# Patient Record
Sex: FEMALE | Race: WHITE | NOT HISPANIC OR LATINO | URBAN - METROPOLITAN AREA
[De-identification: names, ages, dates, MRNs, and addresses within clinical notes are randomized per-mention and may not be internally consistent; named-entity substitution may affect disease eponyms.]

---

## 2023-08-21 ENCOUNTER — HOSPITAL ENCOUNTER (OUTPATIENT)
Dept: DATA CONVERSION | Facility: HOSPITAL | Age: 38
Discharge: HOME | End: 2023-08-21
Payer: COMMERCIAL

## 2023-08-21 DIAGNOSIS — Z91.89 OTHER SPECIFIED PERSONAL RISK FACTORS, NOT ELSEWHERE CLASSIFIED: ICD-10-CM

## 2023-08-21 DIAGNOSIS — R92.2 INCONCLUSIVE MAMMOGRAM: ICD-10-CM

## 2023-09-02 PROBLEM — M94.0 COSTOCHONDRAL JUNCTION SYNDROME: Status: ACTIVE | Noted: 2023-09-02

## 2023-09-02 PROBLEM — N91.2 AMENORRHEA: Status: ACTIVE | Noted: 2023-09-02

## 2023-09-02 PROBLEM — N92.1 MENOMETRORRHAGIA: Status: ACTIVE | Noted: 2023-09-02

## 2023-09-02 PROBLEM — N92.0 MENORRHAGIA WITH REGULAR CYCLE: Status: ACTIVE | Noted: 2023-09-02

## 2023-09-02 PROBLEM — L60.3 ONYCHODYSTROPHY: Status: ACTIVE | Noted: 2023-09-02

## 2023-09-02 PROBLEM — N93.0 POSTCOITAL BLEEDING: Status: ACTIVE | Noted: 2023-09-02

## 2023-09-02 PROBLEM — D64.9 ANEMIA: Status: ACTIVE | Noted: 2023-09-02

## 2023-09-02 PROBLEM — M25.50 ARTHRALGIA: Status: ACTIVE | Noted: 2023-09-02

## 2023-09-02 PROBLEM — F41.9 ANXIETY: Status: ACTIVE | Noted: 2023-09-02

## 2023-09-02 PROBLEM — F41.8 MIXED ANXIETY AND DEPRESSIVE DISORDER: Status: ACTIVE | Noted: 2023-09-02

## 2023-09-02 PROBLEM — M26.629 TEMPOROMANDIBULAR JOINT-PAIN-DYSFUNCTION SYNDROME: Status: ACTIVE | Noted: 2023-09-02

## 2023-09-02 RX ORDER — ALPRAZOLAM 0.25 MG/1
0.25 TABLET ORAL DAILY PRN
COMMUNITY
Start: 2022-10-12 | End: 2024-02-09 | Stop reason: SDUPTHER

## 2023-09-02 RX ORDER — ASCORBIC ACID 500 MG
TABLET ORAL
COMMUNITY

## 2023-09-02 RX ORDER — ZINC GLUCONATE 50 MG
50 TABLET ORAL DAILY
COMMUNITY
End: 2023-11-30 | Stop reason: WASHOUT

## 2023-09-02 RX ORDER — CHOLECALCIFEROL (VITAMIN D3) 25 MCG
25 TABLET ORAL DAILY
COMMUNITY

## 2023-09-02 RX ORDER — SERTRALINE HYDROCHLORIDE 25 MG/1
25 TABLET, FILM COATED ORAL DAILY
COMMUNITY
End: 2023-11-15

## 2023-11-13 ENCOUNTER — TELEPHONE (OUTPATIENT)
Dept: OBSTETRICS AND GYNECOLOGY | Facility: CLINIC | Age: 38
End: 2023-11-13
Payer: COMMERCIAL

## 2023-11-13 NOTE — TELEPHONE ENCOUNTER
Pt pregnant; amenorrhea appt scheduled for 11/30.  Pt c/o dry, tickly cough x2 weeks and is wanting to know what she can take OTC.  Pt advised ok to take Robitussin, cough drops, and plain mucinex.  Advised if no improvement to call her PCP

## 2023-11-15 DIAGNOSIS — F41.9 ANXIETY: ICD-10-CM

## 2023-11-15 RX ORDER — SERTRALINE HYDROCHLORIDE 25 MG/1
25 TABLET, FILM COATED ORAL DAILY
Qty: 90 TABLET | Refills: 2 | Status: SHIPPED | OUTPATIENT
Start: 2023-11-15 | End: 2024-02-09 | Stop reason: SDUPTHER

## 2023-11-27 NOTE — PROGRESS NOTES
Subjective   Patient ID [id number]   Abigail Mann is a 38 y.o. No obstetric history on file. at Unknown with a working estimated date of delivery of Not found. who presents for an initial prenatal visit.     Her pregnancy is complicated by:  AMA    OB History   No obstetric history on file.          Objective   Physical Exam     Pregravid BMI: Could not be calculated             PROCEDURE:  OB/GYN Transvaginal U/S    Intrauterine - yes  Yolk Sac - yes  Fetal Pole- single  FHT  120  EDC  7/17/2024  CRL       OBGyn Exam    General Physical Exam    HEENT: normal Heart: normal Skin: normal   Thyroid: normal Lungs: normal Extremities: normal   Lymph Nodes: normal Breasts: normal Neurological: normal   Abdomen: normal        Pelvic Exam    Vulva: normal Vagina: normal   Cervix: normal Adnexa: normal   Rectum: normal Spines: average   Subpubic Arch: normal       Prenatal Labs  Results for orders placed or performed in visit on 12/08/22   [employer] TEST   Result Value Ref Range    [employer] Specimen sent,Results will be sent to Physician          Assessment/Plan   Diagnoses and all orders for this visit:  Amenorrhea  -     POCT UA Automated manually resulted  -     POCT pregnancy, urine manually resulted  -     CBC Anemia Panel With Reflex,Pregnancy; Future  -     Type And Screen; Future  -     Hepatitis B surface antigen; Future  -     Hepatitis C antibody; Future  -     Rubella Antibody, IgG; Future  -     Syphilis Screen with Reflex; Future  -     HIV 1/2 Antigen/Antibody Screen with Reflex to Confirmation; Future  -     Urine Culture  -     C. Trachomatis / N. Gonorrhoeae, Amplified Detection; Future  -     [employer] Prequel Prenatal Screen; Future    Immunizations:   Prenatal Labs ordered  Daily prenatal vitamins prescribed  First trimester screening and second trimester screening discussed. Patient decided to have done  Follow up in 4 weeks for return OB visit.

## 2023-11-28 ENCOUNTER — TELEPHONE (OUTPATIENT)
Dept: OBSTETRICS AND GYNECOLOGY | Facility: CLINIC | Age: 38
End: 2023-11-28
Payer: COMMERCIAL

## 2023-11-28 NOTE — TELEPHONE ENCOUNTER
Amenorrhea appt scheduled for Thursday.  Pt with one episode of scant spotting while wiping Sunday, and had some cramping yesterday.  Pt unsure if she has gas pain or if it was actual cramping.  Reassured pt, to call if pt has any bleeding like a period.  Advised pt she will have ultrasound on thursday

## 2023-11-30 ENCOUNTER — INITIAL PRENATAL (OUTPATIENT)
Dept: OBSTETRICS AND GYNECOLOGY | Facility: CLINIC | Age: 38
End: 2023-11-30
Payer: COMMERCIAL

## 2023-11-30 VITALS — BODY MASS INDEX: 20.05 KG/M2 | WEIGHT: 135.8 LBS | DIASTOLIC BLOOD PRESSURE: 80 MMHG | SYSTOLIC BLOOD PRESSURE: 128 MMHG

## 2023-11-30 DIAGNOSIS — N91.2 AMENORRHEA: Primary | ICD-10-CM

## 2023-11-30 LAB
POC APPEARANCE, URINE: CLEAR
POC BILIRUBIN, URINE: NEGATIVE
POC BLOOD, URINE: ABNORMAL
POC COLOR, URINE: YELLOW
POC GLUCOSE, URINE: NEGATIVE MG/DL
POC KETONES, URINE: NEGATIVE MG/DL
POC LEUKOCYTES, URINE: NEGATIVE
POC NITRITE,URINE: NEGATIVE
POC PH, URINE: 6.5 PH
POC PROTEIN, URINE: NEGATIVE MG/DL
POC SPECIFIC GRAVITY, URINE: 1.01
POC UROBILINOGEN, URINE: 0.2 EU/DL
PREGNANCY TEST URINE, POC: POSITIVE

## 2023-11-30 PROCEDURE — 76801 OB US < 14 WKS SINGLE FETUS: CPT | Performed by: OBSTETRICS & GYNECOLOGY

## 2023-11-30 PROCEDURE — 87800 DETECT AGNT MULT DNA DIREC: CPT | Performed by: OBSTETRICS & GYNECOLOGY

## 2023-11-30 PROCEDURE — 0500F INITIAL PRENATAL CARE VISIT: CPT | Performed by: OBSTETRICS & GYNECOLOGY

## 2023-11-30 PROCEDURE — 87086 URINE CULTURE/COLONY COUNT: CPT | Performed by: OBSTETRICS & GYNECOLOGY

## 2023-11-30 PROCEDURE — 81003 URINALYSIS AUTO W/O SCOPE: CPT | Performed by: OBSTETRICS & GYNECOLOGY

## 2023-11-30 ASSESSMENT — PATIENT HEALTH QUESTIONNAIRE - PHQ9
SUM OF ALL RESPONSES TO PHQ9 QUESTIONS 1 & 2: 0
2. FEELING DOWN, DEPRESSED OR HOPELESS: NOT AT ALL
1. LITTLE INTEREST OR PLEASURE IN DOING THINGS: NOT AT ALL

## 2023-11-30 ASSESSMENT — LIFESTYLE VARIABLES
AUDIT-C TOTAL SCORE: 0
SKIP TO QUESTIONS 9-10: 1
HOW OFTEN DO YOU HAVE SIX OR MORE DRINKS ON ONE OCCASION: NEVER
HOW MANY STANDARD DRINKS CONTAINING ALCOHOL DO YOU HAVE ON A TYPICAL DAY: PATIENT DOES NOT DRINK
HOW OFTEN DO YOU HAVE A DRINK CONTAINING ALCOHOL: NEVER

## 2023-11-30 ASSESSMENT — ENCOUNTER SYMPTOMS
DEPRESSION: 0
OCCASIONAL FEELINGS OF UNSTEADINESS: 0
LOSS OF SENSATION IN FEET: 0

## 2023-12-01 LAB
C TRACH RRNA SPEC QL NAA+PROBE: NEGATIVE
N GONORRHOEA DNA SPEC QL PROBE+SIG AMP: NEGATIVE

## 2023-12-02 LAB — BACTERIA UR CULT: NO GROWTH

## 2023-12-08 ENCOUNTER — APPOINTMENT (OUTPATIENT)
Dept: PRIMARY CARE | Facility: CLINIC | Age: 38
End: 2023-12-08
Payer: COMMERCIAL

## 2023-12-08 ENCOUNTER — TELEPHONE (OUTPATIENT)
Dept: OBSTETRICS AND GYNECOLOGY | Facility: CLINIC | Age: 38
End: 2023-12-08
Payer: COMMERCIAL

## 2023-12-08 ASSESSMENT — PROMIS GLOBAL HEALTH SCALE
RATE_QUALITY_OF_LIFE: VERY GOOD
RATE_SOCIAL_SATISFACTION: VERY GOOD
RATE_GENERAL_HEALTH: VERY GOOD
CARRYOUT_PHYSICAL_ACTIVITIES: COMPLETELY
EMOTIONAL_PROBLEMS: SOMETIMES
CARRYOUT_SOCIAL_ACTIVITIES: VERY GOOD
RATE_PHYSICAL_HEALTH: VERY GOOD
RATE_MENTAL_HEALTH: VERY GOOD
RATE_AVERAGE_FATIGUE: MODERATE
RATE_AVERAGE_PAIN: 0

## 2023-12-08 NOTE — TELEPHONE ENCOUNTER
8 weeks pregnant.  Pt with continued spotting since appointment last week.  Pt states has been mostly brown until today.  Today had a bigger episode of spotting when she went to the bathroom, was more red.  She also has had a headache all day today, which she just took tylenol for.  Pt states she was having the spotting when she came in for her exam as well.  Pt advised to continue to monitor and call with any bleeding like a period.

## 2023-12-11 ENCOUNTER — ROUTINE PRENATAL (OUTPATIENT)
Dept: OBSTETRICS AND GYNECOLOGY | Facility: CLINIC | Age: 38
End: 2023-12-11
Payer: COMMERCIAL

## 2023-12-11 ENCOUNTER — TELEPHONE (OUTPATIENT)
Dept: OBSTETRICS AND GYNECOLOGY | Facility: CLINIC | Age: 38
End: 2023-12-11
Payer: COMMERCIAL

## 2023-12-11 ENCOUNTER — PREP FOR PROCEDURE (OUTPATIENT)
Dept: OBSTETRICS AND GYNECOLOGY | Facility: HOSPITAL | Age: 38
End: 2023-12-11

## 2023-12-11 VITALS — BODY MASS INDEX: 19.94 KG/M2 | SYSTOLIC BLOOD PRESSURE: 128 MMHG | DIASTOLIC BLOOD PRESSURE: 70 MMHG | WEIGHT: 135 LBS

## 2023-12-11 DIAGNOSIS — O03.4 INCOMPLETE SPONTANEOUS ABORTION WITHOUT COMPLICATION (HHS-HCC): Primary | ICD-10-CM

## 2023-12-11 PROCEDURE — 76801 OB US < 14 WKS SINGLE FETUS: CPT | Performed by: OBSTETRICS & GYNECOLOGY

## 2023-12-11 PROCEDURE — 0501F PRENATAL FLOW SHEET: CPT | Performed by: OBSTETRICS & GYNECOLOGY

## 2023-12-11 RX ORDER — SODIUM CHLORIDE, SODIUM LACTATE, POTASSIUM CHLORIDE, CALCIUM CHLORIDE 600; 310; 30; 20 MG/100ML; MG/100ML; MG/100ML; MG/100ML
125 INJECTION, SOLUTION INTRAVENOUS CONTINUOUS
Status: CANCELLED | OUTPATIENT
Start: 2023-12-11

## 2023-12-11 ASSESSMENT — LIFESTYLE VARIABLES
HOW OFTEN DO YOU HAVE A DRINK CONTAINING ALCOHOL: MONTHLY OR LESS
HOW OFTEN DO YOU HAVE SIX OR MORE DRINKS ON ONE OCCASION: NEVER
SKIP TO QUESTIONS 9-10: 1
HOW MANY STANDARD DRINKS CONTAINING ALCOHOL DO YOU HAVE ON A TYPICAL DAY: 1 OR 2
AUDIT-C TOTAL SCORE: 1

## 2023-12-11 ASSESSMENT — PATIENT HEALTH QUESTIONNAIRE - PHQ9
SUM OF ALL RESPONSES TO PHQ9 QUESTIONS 1 & 2: 0
1. LITTLE INTEREST OR PLEASURE IN DOING THINGS: NOT AT ALL
2. FEELING DOWN, DEPRESSED OR HOPELESS: NOT AT ALL

## 2023-12-11 ASSESSMENT — ENCOUNTER SYMPTOMS
LOSS OF SENSATION IN FEET: 0
OCCASIONAL FEELINGS OF UNSTEADINESS: 0
DEPRESSION: 0

## 2023-12-11 NOTE — TELEPHONE ENCOUNTER
8 weeks pregnant.  Pt c/o continued bleeding since end of last week.  Pt states bleeding a little more heavier now, is soaking through panti liners,  and is bright red.  Pt requesting appt for a scan.  Appt scheduled for today at 3:15 for rescan

## 2023-12-11 NOTE — PROGRESS NOTES
Subjective   Patient ID [id number]   Abigail Mann is a 38 y.o.   who presents for initial amenorrhea visit.    Lmp=  2023  Bleeding  yes, conintued spotting and heavier at times  Pain  no    OB History    Para Term  AB Living   3 2 2 0 0 2   SAB IAB Ectopic Multiple Live Births   0 0 0 0 2      # Outcome Date GA Lbr Bobby/2nd Weight Sex Delivery Anes PTL Lv   3 Current            2 Term 20   3175 g F CS-LTranv   DESI   1 Term 10/30/18   3544 g M CS-LTranv   DESI          Objective   Physical Exam  Weight: 61.2 kg (135 lb)  BP: 128/70            PROCEDURE:  OB/GYN Transvaginal U/S    Intrauterine - yes  Yolk Sac - yes  Fetal Pole- single  FHT  no  EDC  no  CRL  7w,1d     OBGyn Exam    General Physical Exam    HEENT: normal Heart: normal Skin: normal   Thyroid: normal Lungs: normal Extremities: normal   Lymph Nodes: normal Breasts: normal Neurological: normal   Abdomen: normal        Pelvic Exam    Vulva: normal Vagina: normal   Cervix: normal Adnexa: normal   Rectum: normal Spines: average   Subpubic Arch: normal       Prenatal Labs  Results for orders placed or performed in visit on 23   Urine Culture    Specimen: Clean Catch/Voided; Urine   Result Value Ref Range    Urine Culture No growth    C. Trachomatis / N. Gonorrhoeae, Amplified Detection   Result Value Ref Range    Neisseria gonorrhea,Amplified Negative Negative    Chlamydia trachomatis, Amplified Negative Negative   POCT UA Automated manually resulted   Result Value Ref Range    POC Color, Urine Yellow Straw, Yellow, Light-Yellow    POC Appearance, Urine Clear Clear    POC Glucose, Urine NEGATIVE NEGATIVE mg/dl    POC Bilirubin, Urine NEGATIVE NEGATIVE    POC Ketones, Urine NEGATIVE NEGATIVE mg/dl    POC Specific Gravity, Urine 1.015 1.005 - 1.035    POC Blood, Urine TRACE-Intact (A) NEGATIVE    POC PH, Urine 6.5 No Reference Range Established PH    POC Protein, Urine NEGATIVE NEGATIVE, 30 (1+) mg/dl    POC  Urobilinogen, Urine 0.2 0.2, 1.0 EU/DL    Poc Nitrite, Urine NEGATIVE NEGATIVE    POC Leukocytes, Urine NEGATIVE NEGATIVE   POCT pregnancy, urine manually resulted   Result Value Ref Range    Preg Test, Ur Positive (A) Negative         Assessment/Plan   Diagnoses and all orders for this visit:  Incomplete spontaneous  without complication    Quant ordered

## 2023-12-12 ENCOUNTER — HOSPITAL ENCOUNTER (OUTPATIENT)
Facility: HOSPITAL | Age: 38
Setting detail: OBSERVATION
Discharge: HOME | DRG: 770 | End: 2023-12-12
Attending: OBSTETRICS & GYNECOLOGY | Admitting: OBSTETRICS & GYNECOLOGY
Payer: COMMERCIAL

## 2023-12-12 ENCOUNTER — ANESTHESIA (OUTPATIENT)
Dept: OBSTETRICS AND GYNECOLOGY | Facility: HOSPITAL | Age: 38
DRG: 770 | End: 2023-12-12
Payer: COMMERCIAL

## 2023-12-12 VITALS
SYSTOLIC BLOOD PRESSURE: 107 MMHG | TEMPERATURE: 97.7 F | HEART RATE: 82 BPM | RESPIRATION RATE: 16 BRPM | DIASTOLIC BLOOD PRESSURE: 58 MMHG | OXYGEN SATURATION: 100 %

## 2023-12-12 DIAGNOSIS — O03.4 INCOMPLETE SPONTANEOUS ABORTION WITHOUT COMPLICATION (HHS-HCC): Primary | ICD-10-CM

## 2023-12-12 PROCEDURE — 88305 TISSUE EXAM BY PATHOLOGIST: CPT | Mod: TC | Performed by: OBSTETRICS & GYNECOLOGY

## 2023-12-12 PROCEDURE — 88305 TISSUE EXAM BY PATHOLOGIST: CPT | Performed by: PATHOLOGY

## 2023-12-12 PROCEDURE — G0378 HOSPITAL OBSERVATION PER HR: HCPCS

## 2023-12-12 PROCEDURE — 59812 TREATMENT OF MISCARRIAGE: CPT | Performed by: OBSTETRICS & GYNECOLOGY

## 2023-12-12 PROCEDURE — 7100000016 HC LABOR RECOVERY PER HOUR: Performed by: OBSTETRICS & GYNECOLOGY

## 2023-12-12 PROCEDURE — 2500000004 HC RX 250 GENERAL PHARMACY W/ HCPCS (ALT 636 FOR OP/ED): Performed by: OBSTETRICS & GYNECOLOGY

## 2023-12-12 PROCEDURE — A59812 PR SURG RX INCOMPLETE ABORTN: Performed by: NURSE ANESTHETIST, CERTIFIED REGISTERED

## 2023-12-12 PROCEDURE — 3700000014 HC AN EPIDURAL BLOCK CHARGE: Performed by: OBSTETRICS & GYNECOLOGY

## 2023-12-12 PROCEDURE — 2500000004 HC RX 250 GENERAL PHARMACY W/ HCPCS (ALT 636 FOR OP/ED): Performed by: NURSE ANESTHETIST, CERTIFIED REGISTERED

## 2023-12-12 PROCEDURE — 2500000005 HC RX 250 GENERAL PHARMACY W/O HCPCS: Performed by: NURSE ANESTHETIST, CERTIFIED REGISTERED

## 2023-12-12 PROCEDURE — 1210000001 HC SEMI-PRIVATE ROOM DAILY

## 2023-12-12 PROCEDURE — 3600000024 HC TREATMENT OF THERAPEUTIC ABORTION: Performed by: OBSTETRICS & GYNECOLOGY

## 2023-12-12 PROCEDURE — 3700000018 HC OB ANESTHESIA C-SECTION: Performed by: OBSTETRICS & GYNECOLOGY

## 2023-12-12 RX ORDER — FENTANYL CITRATE 50 UG/ML
INJECTION, SOLUTION INTRAMUSCULAR; INTRAVENOUS AS NEEDED
Status: DISCONTINUED | OUTPATIENT
Start: 2023-12-12 | End: 2023-12-12

## 2023-12-12 RX ORDER — ONDANSETRON HYDROCHLORIDE 2 MG/ML
INJECTION, SOLUTION INTRAVENOUS AS NEEDED
Status: DISCONTINUED | OUTPATIENT
Start: 2023-12-12 | End: 2023-12-12

## 2023-12-12 RX ORDER — MIDAZOLAM HYDROCHLORIDE 1 MG/ML
INJECTION, SOLUTION INTRAMUSCULAR; INTRAVENOUS AS NEEDED
Status: DISCONTINUED | OUTPATIENT
Start: 2023-12-12 | End: 2023-12-12

## 2023-12-12 RX ORDER — KETOROLAC TROMETHAMINE 30 MG/ML
INJECTION, SOLUTION INTRAMUSCULAR; INTRAVENOUS AS NEEDED
Status: DISCONTINUED | OUTPATIENT
Start: 2023-12-12 | End: 2023-12-12

## 2023-12-12 RX ORDER — SODIUM CHLORIDE, SODIUM LACTATE, POTASSIUM CHLORIDE, CALCIUM CHLORIDE 600; 310; 30; 20 MG/100ML; MG/100ML; MG/100ML; MG/100ML
125 INJECTION, SOLUTION INTRAVENOUS CONTINUOUS
Status: DISCONTINUED | OUTPATIENT
Start: 2023-12-12 | End: 2023-12-12

## 2023-12-12 RX ORDER — LIDOCAINE HYDROCHLORIDE 20 MG/ML
INJECTION, SOLUTION INFILTRATION; PERINEURAL AS NEEDED
Status: DISCONTINUED | OUTPATIENT
Start: 2023-12-12 | End: 2023-12-12

## 2023-12-12 RX ORDER — PROPOFOL 10 MG/ML
INJECTION, EMULSION INTRAVENOUS AS NEEDED
Status: DISCONTINUED | OUTPATIENT
Start: 2023-12-12 | End: 2023-12-12

## 2023-12-12 RX ADMIN — LIDOCAINE HYDROCHLORIDE 60 MG: 20 INJECTION, SOLUTION INFILTRATION; PERINEURAL at 08:14

## 2023-12-12 RX ADMIN — MIDAZOLAM 2 MG: 1 INJECTION INTRAMUSCULAR; INTRAVENOUS at 08:14

## 2023-12-12 RX ADMIN — PROPOFOL 170 MG: 10 INJECTION, EMULSION INTRAVENOUS at 08:14

## 2023-12-12 RX ADMIN — KETOROLAC TROMETHAMINE 30 MG: 30 INJECTION, SOLUTION INTRAMUSCULAR at 08:23

## 2023-12-12 RX ADMIN — FENTANYL CITRATE 50 MCG: 0.05 INJECTION, SOLUTION INTRAMUSCULAR; INTRAVENOUS at 08:14

## 2023-12-12 RX ADMIN — ONDANSETRON HYDROCHLORIDE 4 MG: 2 INJECTION INTRAMUSCULAR; INTRAVENOUS at 08:21

## 2023-12-12 RX ADMIN — SODIUM CHLORIDE, SODIUM LACTATE, POTASSIUM CHLORIDE, AND CALCIUM CHLORIDE 125 ML/HR: 600; 310; 30; 20 INJECTION, SOLUTION INTRAVENOUS at 07:53

## 2023-12-12 SDOH — HEALTH STABILITY: MENTAL HEALTH: CURRENT SMOKER: 0

## 2023-12-12 ASSESSMENT — PAIN SCALES - GENERAL
PAINLEVEL_OUTOF10: 0 - NO PAIN
PAIN_LEVEL: 0

## 2023-12-12 ASSESSMENT — COLUMBIA-SUICIDE SEVERITY RATING SCALE - C-SSRS
6. HAVE YOU EVER DONE ANYTHING, STARTED TO DO ANYTHING, OR PREPARED TO DO ANYTHING TO END YOUR LIFE?: NO
1. IN THE PAST MONTH, HAVE YOU WISHED YOU WERE DEAD OR WISHED YOU COULD GO TO SLEEP AND NOT WAKE UP?: NO
2. HAVE YOU ACTUALLY HAD ANY THOUGHTS OF KILLING YOURSELF?: NO

## 2023-12-12 NOTE — ANESTHESIA PREPROCEDURE EVALUATION
Patient: Abigail Mann    Evaluation Method: In-person visit    Procedure Information       Date/Time: 23 0800    Procedures:       Dilatation and Curettage       Therapeutic    Location: [facility] OB 01 / [facility]    Surgeons: Ca Reyes MD            Relevant Problems   Anesthesia (within normal limits)      Cardiovascular (within normal limits)      Endocrine (within normal limits)      GI (within normal limits)      /Renal (within normal limits)      Neuro/Psych   (+) Anxiety   (+) Mixed anxiety and depressive disorder      Pulmonary (within normal limits)      GI/Hepatic (within normal limits)      Hematology   (+) Anemia       Clinical information reviewed:   Tobacco  Allergies  Meds  Problems  Med Hx  Surg Hx   Fam Hx  Soc   Hx        NPO Detail:  NPO/Void Status  Date of Last Liquid: 23  Time of Last Liquid:   Date of Last Solid: 23  Time of Last Solid: 1300         OB/GYN     Physical Exam    Airway  Mallampati: II  TM distance: >3 FB  Neck ROM: full     Cardiovascular - normal exam     Dental - normal exam     Pulmonary - normal exam     Abdominal - normal exam             Anesthesia Plan    ASA 1     general     The patient is not a current smoker.  Patient did not smoke on day of procedure.  Education provided regarding risk of obstructive sleep apnea.  intravenous induction   Anesthetic plan and risks discussed with patient.    Plan discussed with CRNA.

## 2023-12-12 NOTE — OP NOTE
Date: 2023  OR Location: [facility] [place] 3 OB    Name: Abigail Mann, : 1985, Age: 38 y.o., MRN: [id number], Sex: female    Diagnosis  Pre-op Diagnosis     * Incomplete spontaneous  without complication [O03.4] Post-op Diagnosis     * Incomplete spontaneous  without complication [O03.4]     Procedures  Dilatation and Curettage  17439 - PA INDUCED  DILATION AND CURETTAGE     Therapeutic  89256 - PA TX INCOMPLETE  ANY TRIMESTER SURGICAL      Surgeons      * Ca Reyes - Primary    Resident/Fellow/Other Assistant:  Surgeon(s) and Role:    Procedure Summary  Anesthesia: * No anesthesia type entered *  ASA: I  Anesthesia Staff: CRNA: PRATIMA Carter-CRNA  Estimated Blood Loss: 50 mL  Intra-op Medications:   Medication Name Total Dose   lactated Ringer's infusion 79.17 mL              Anesthesia Record               Intraprocedure I/O Totals          Intake    lactated Ringer's infusion 400.00 mL    Total Intake 400 mL       Output    Est. Blood Loss 50 mL    Total Output 50 mL       Net    Net Volume 350 mL          Specimen:   ID Type Source Tests Collected by Time   1 : specimen Tissue [employer] SURGICAL PATHOLOGY EXAM Ca Reyes MD 2023 0825        Staff:   No surgical staff documented.         Procedure Details:  The patient was seen in the preoperative area. The site of surgery was properly noted/marked if necessary per policy. The patient has been actively warmed in preoperative area. Preoperative antibiotics are not indicated. Venous thrombosis prophylaxis are not indicated.    The patient was taken to the operative and after adequate anesthesia was given an examination under anesthesia was performed. The uterus appeared to be 8 weeks size there is no adnexal pathology palpable. the patient was placed in dorsal lithotomy position and then the patient's vulva vagina and a inner  aspects of the thighs were prepped and draped the usual  sterile manner. a straight cath was used to empty the bladder. at this point a weighted speculum was placed in the posterior fornix of the vagina and a single-tooth tenaculum was used to grasp the anterior lip of the cervix .the uterus was then sounded to [8] cm using Fernandez dilators. an [8] millimeter curved suction tip catheter was placed in the uterine cavity. The contents of the uterus were then evacuated. at this point a small curette was used to curette the endometrium and there was no further tissue noted. at this time the [8] millimeter curved suction tip catheter was placed in the fundus of the uterus and again no tissue remained at this time. the tenaculum was then removed and there was no bleeding noted from the tenaculum site. the speculum was then removed as well.  final sponge count and instrument count were found to be correct. and the patient tolerated the procedure very well and was then taken to the recovery room in overall satisfactory condition.     Findings: uterus 8 weeks size    Complications:  none     Disposition: PACU - hemodynamically stable.  Condition: stable

## 2023-12-12 NOTE — DISCHARGE INSTRUCTIONS
"After Your Surgery Discharge Instructions    After Discharge Orders:  Follow up in 2 weeks          After your surgery - signs and symptoms to watch for:  Fever - Oral temperature greater than 100.4 degrees Fahrenheit  Foul-smelling vaginal discharge  Headache unrelieved by \"pain meds\"  Difficulty urinating  Difficulty breathing with or without chest pain  New calf pain especially if only on one side  Sudden, continuing increased vaginal bleeding with or without clots       What to do at home:  Resume activity gradually   Don't lift anything heavier than 10-15 pounds until OK'd by your Physician   No sex or anything in the vagina until OK'd by your Physician   Take pain medication as prescribed whenever you need them  Wear compression stockings if prescribed   To avoid/relieve constipation take stool softeners if advised    "

## 2023-12-12 NOTE — ANESTHESIA PROCEDURE NOTES
Airway  Date/Time: 12/12/2023 8:17 AM  Urgency: elective    Airway not difficult    Staffing  Performed: CRNA   Authorized by: CELESTE Carter    Performed by: CELESTE Carter  Patient location during procedure: OR    Indications and Patient Condition  Indications for airway management: anesthesia  Spontaneous ventilation: present  Sedation level: deep  Preoxygenated: yes  Patient position: sniffing  MILS maintained throughout  Mask difficulty assessment: 1 - vent by mask  No planned trial extubation    Final Airway Details  Final airway type: supraglottic airway      Successful airway: unique  Size 4     Number of attempts at approach: 1  Ventilation between attempts: none  Number of other approaches attempted: 0

## 2023-12-12 NOTE — ANESTHESIA POSTPROCEDURE EVALUATION
Patient: Abigail Mann    Procedure Summary       Date: 23 Room / Location: [facility]  [facility]    Anesthesia Start: 808 Anesthesia Stop: 840    Procedures:       Dilatation and Curettage       Therapeutic Diagnosis:       Incomplete spontaneous  without complication      (Incomplete spontaneous  without complication [O03.4])    Surgeons: Ca Reyes MD Responsible Provider: CELESTE Carter    Anesthesia Type: general ASA Status: 1            Anesthesia Type: general    Vitals Value Taken Time   BP 91/55 23 0835   Temp 36.3 °C (97.3 °F) 23 0835   Pulse 76 23 0839   Resp 16 23 0835   SpO2 100 % 23 0839       Anesthesia Post Evaluation    Patient location during evaluation: bedside  Patient participation: complete - patient participated  Level of consciousness: responsive to light touch  Pain score: 0  Pain management: adequate  Multimodal analgesia pain management approach  Airway patency: patent  Two or more strategies used to mitigate risk of obstructive sleep apnea  Cardiovascular status: acceptable  Respiratory status: acceptable  Hydration status: acceptable  Postoperative Nausea and Vomiting: none        There were no known notable events for this encounter.

## 2023-12-12 NOTE — H&P
Obstetrical Admission History and Physical     37 yo  who presents wit hincomplete  for suction D&C     Obstetrical History   OB History    Para Term  AB Living   3 2 2 0 0 2   SAB IAB Ectopic Multiple Live Births   0 0 0 0 2      # Outcome Date GA Lbr Bobby/2nd Weight Sex Delivery Anes PTL Lv   3 Current            2 Term 20   3175 g F CS-LTranv   DESI   1 Term 10/30/18   3544 g M CS-LTranv   DESI       Past Medical History  Past Medical History:   Diagnosis Date    Depression         Past Surgical History   Past Surgical History:   Procedure Laterality Date     SECTION, LOW TRANSVERSE  2020    x 2 & 2018    NASAL POLYP SURGERY Right 2020       Social History  Social History     Tobacco Use    Smoking status: Never    Smokeless tobacco: Never   Substance Use Topics    Alcohol use: Not Currently     Substance and Sexual Activity   Drug Use Never       Allergies  Patient has no known allergies.     Medications  Medications Prior to Admission   Medication Sig Dispense Refill Last Dose    ALPRAZolam (Xanax) 0.25 mg tablet Take 1 tablet (0.25 mg) by mouth once daily as needed for anxiety.       ascorbic acid (Vitamin C) 500 mg tablet Take by mouth.       cholecalciferol (Vitamin D-3) 25 MCG (1000 UT) tablet Take 1 tablet (25 mcg) by mouth once daily.       sertraline (Zoloft) 25 mg tablet TAKE ONE TABLET BY MOUTH ONCE A DAY 90 tablet 2        Objective    Last Vitals  Temp Pulse Resp BP MAP O2 Sat   36.4 °C (97.5 °F) 86 16 103/68   100 %     Physical Examination  Normal exam  Ultrasound with CRL=7w,1d with no FHT    Lab Review  Labs in chart were reviewed.

## 2023-12-13 LAB
LABORATORY COMMENT REPORT: NORMAL
PATH REPORT.FINAL DX SPEC: NORMAL
PATH REPORT.GROSS SPEC: NORMAL
PATH REPORT.RELEVANT HX SPEC: NORMAL
PATH REPORT.TOTAL CANCER: NORMAL

## 2024-01-03 NOTE — PROGRESS NOTES
Subjective     Abigail Mann is a 38 y.o. female who presents to the clinic 2 weeks status post D&C for SAB.   Pathology: normal  Eating a regular diet without difficulty.   Bowel movements are normal.   The patient is not having any pain.    Objective   LMP 09/28/2023   General:  alert and oriented, in no acute distress   Abdomen: soft, bowel sounds active, non-tender   Incision:   healing well, no drainage, no erythema, 2no seroma, no swelling, no dehiscence, incision well approximated       Assessment/Plan    Doing well postoperatively.  Operative findings again reviewed. Pathology report discussed.    1. Continue any current medications.  2. Activity restrictions: none  3. Follow up: 1 or prn

## 2024-01-04 ENCOUNTER — OFFICE VISIT (OUTPATIENT)
Dept: OBSTETRICS AND GYNECOLOGY | Facility: CLINIC | Age: 39
End: 2024-01-04
Payer: COMMERCIAL

## 2024-01-04 VITALS
DIASTOLIC BLOOD PRESSURE: 70 MMHG | WEIGHT: 140.2 LBS | HEIGHT: 68 IN | SYSTOLIC BLOOD PRESSURE: 116 MMHG | BODY MASS INDEX: 21.25 KG/M2

## 2024-01-04 DIAGNOSIS — Z09 POSTOP CHECK: Primary | ICD-10-CM

## 2024-01-04 PROCEDURE — 99213 OFFICE O/P EST LOW 20 MIN: CPT | Performed by: OBSTETRICS & GYNECOLOGY

## 2024-01-04 PROCEDURE — 1036F TOBACCO NON-USER: CPT | Performed by: OBSTETRICS & GYNECOLOGY

## 2024-01-04 PROCEDURE — 99024 POSTOP FOLLOW-UP VISIT: CPT | Performed by: OBSTETRICS & GYNECOLOGY

## 2024-01-04 ASSESSMENT — ENCOUNTER SYMPTOMS
DEPRESSION: 0
LOSS OF SENSATION IN FEET: 0
OCCASIONAL FEELINGS OF UNSTEADINESS: 0

## 2024-01-04 ASSESSMENT — LIFESTYLE VARIABLES
HOW MANY STANDARD DRINKS CONTAINING ALCOHOL DO YOU HAVE ON A TYPICAL DAY: 1 OR 2
HOW OFTEN DO YOU HAVE A DRINK CONTAINING ALCOHOL: MONTHLY OR LESS

## 2024-01-04 ASSESSMENT — PAIN SCALES - GENERAL: PAINLEVEL: 0-NO PAIN

## 2024-01-08 ENCOUNTER — TELEPHONE (OUTPATIENT)
Dept: OBSTETRICS AND GYNECOLOGY | Facility: CLINIC | Age: 39
End: 2024-01-08
Payer: COMMERCIAL

## 2024-01-08 NOTE — TELEPHONE ENCOUNTER
EST post op pt treated for yeast last week with a 3 day cream. Pt states she felt better but now with external itching again. Unsure if she needs additional treatment.

## 2024-01-10 ENCOUNTER — TELEPHONE (OUTPATIENT)
Dept: OBSTETRICS AND GYNECOLOGY | Facility: CLINIC | Age: 39
End: 2024-01-10
Payer: COMMERCIAL

## 2024-01-10 NOTE — TELEPHONE ENCOUNTER
Patient called stating she was advised to use Monistat for vaginal itching. She called today stating that her Sx are getting worse. Advised Diflucan could be called in. Diflucan 150mg take one tablet by mouth as directed #1 with zero refills.

## 2024-01-15 ENCOUNTER — TELEPHONE (OUTPATIENT)
Dept: OBSTETRICS AND GYNECOLOGY | Facility: CLINIC | Age: 39
End: 2024-01-15
Payer: COMMERCIAL

## 2024-01-15 NOTE — TELEPHONE ENCOUNTER
Pt was given a diflucan last week for yeast infection.  Was advised to call office if not resolved after 72 hours for another diflucan.  Pt states she is still having some redness and itching and would like another script.   Diflcuan 150mg #1 as directed with no refill called to [facility] pharmacy.

## 2024-02-09 ENCOUNTER — LAB (OUTPATIENT)
Dept: LAB | Facility: LAB | Age: 39
End: 2024-02-09
Payer: COMMERCIAL

## 2024-02-09 ENCOUNTER — OFFICE VISIT (OUTPATIENT)
Dept: PRIMARY CARE | Facility: CLINIC | Age: 39
End: 2024-02-09
Payer: COMMERCIAL

## 2024-02-09 VITALS
OXYGEN SATURATION: 99 % | BODY MASS INDEX: 21.67 KG/M2 | HEART RATE: 76 BPM | DIASTOLIC BLOOD PRESSURE: 70 MMHG | HEIGHT: 68 IN | SYSTOLIC BLOOD PRESSURE: 103 MMHG | WEIGHT: 143 LBS | TEMPERATURE: 97.9 F

## 2024-02-09 DIAGNOSIS — S16.1XXD STRAIN OF NECK MUSCLE, SUBSEQUENT ENCOUNTER: ICD-10-CM

## 2024-02-09 DIAGNOSIS — Z00.00 ROUTINE MEDICAL EXAM: ICD-10-CM

## 2024-02-09 DIAGNOSIS — Z01.89 LABORATORY TEST: ICD-10-CM

## 2024-02-09 DIAGNOSIS — Z00.00 ROUTINE MEDICAL EXAM: Primary | ICD-10-CM

## 2024-02-09 DIAGNOSIS — F41.9 ANXIETY: ICD-10-CM

## 2024-02-09 DIAGNOSIS — E55.9 VITAMIN D DEFICIENCY: ICD-10-CM

## 2024-02-09 PROBLEM — M94.0 COSTOCHONDRAL JUNCTION SYNDROME: Status: RESOLVED | Noted: 2023-09-02 | Resolved: 2024-02-09

## 2024-02-09 PROBLEM — S16.1XXA CERVICAL STRAIN: Status: ACTIVE | Noted: 2024-02-09

## 2024-02-09 PROBLEM — M25.50 ARTHRALGIA: Status: RESOLVED | Noted: 2023-09-02 | Resolved: 2024-02-09

## 2024-02-09 LAB
25(OH)D3 SERPL-MCNC: 52 NG/ML (ref 31–100)
ANION GAP SERPL CALC-SCNC: 10 MMOL/L
BUN SERPL-MCNC: 13 MG/DL (ref 8–25)
CALCIUM SERPL-MCNC: 9.6 MG/DL (ref 8.5–10.4)
CHLORIDE SERPL-SCNC: 103 MMOL/L (ref 97–107)
CHOLEST SERPL-MCNC: 208 MG/DL (ref 133–200)
CHOLEST/HDLC SERPL: 2.7 {RATIO}
CO2 SERPL-SCNC: 27 MMOL/L (ref 24–31)
CREAT SERPL-MCNC: 0.8 MG/DL (ref 0.4–1.6)
EGFRCR SERPLBLD CKD-EPI 2021: >90 ML/MIN/1.73M*2
ERYTHROCYTE [DISTWIDTH] IN BLOOD BY AUTOMATED COUNT: 11.9 % (ref 11.5–14.5)
GLUCOSE SERPL-MCNC: 86 MG/DL (ref 65–99)
HCT VFR BLD AUTO: 37.5 % (ref 36–46)
HDLC SERPL-MCNC: 78 MG/DL
HGB BLD-MCNC: 12 G/DL (ref 12–16)
LDLC SERPL CALC-MCNC: 115 MG/DL (ref 65–130)
MCH RBC QN AUTO: 30.1 PG (ref 26–34)
MCHC RBC AUTO-ENTMCNC: 32 G/DL (ref 32–36)
MCV RBC AUTO: 94 FL (ref 80–100)
NRBC BLD-RTO: 0 /100 WBCS (ref 0–0)
PLATELET # BLD AUTO: 218 X10*3/UL (ref 150–450)
POTASSIUM SERPL-SCNC: 4.2 MMOL/L (ref 3.4–5.1)
RBC # BLD AUTO: 3.99 X10*6/UL (ref 4–5.2)
SODIUM SERPL-SCNC: 140 MMOL/L (ref 133–145)
TRIGL SERPL-MCNC: 75 MG/DL (ref 40–150)
WBC # BLD AUTO: 7.2 X10*3/UL (ref 4.4–11.3)

## 2024-02-09 PROCEDURE — 1036F TOBACCO NON-USER: CPT | Performed by: INTERNAL MEDICINE

## 2024-02-09 PROCEDURE — 85027 COMPLETE CBC AUTOMATED: CPT

## 2024-02-09 PROCEDURE — 80048 BASIC METABOLIC PNL TOTAL CA: CPT

## 2024-02-09 PROCEDURE — 99395 PREV VISIT EST AGE 18-39: CPT | Performed by: INTERNAL MEDICINE

## 2024-02-09 PROCEDURE — 80061 LIPID PANEL: CPT

## 2024-02-09 PROCEDURE — 36415 COLL VENOUS BLD VENIPUNCTURE: CPT

## 2024-02-09 PROCEDURE — 82306 VITAMIN D 25 HYDROXY: CPT

## 2024-02-09 RX ORDER — ALPRAZOLAM 0.25 MG/1
0.25 TABLET ORAL DAILY PRN
Qty: 20 TABLET | Refills: 0 | Status: SHIPPED | OUTPATIENT
Start: 2024-02-09 | End: 2024-05-09

## 2024-02-09 RX ORDER — SERTRALINE HYDROCHLORIDE 25 MG/1
25 TABLET, FILM COATED ORAL DAILY
Qty: 90 TABLET | Refills: 2 | Status: SHIPPED | OUTPATIENT
Start: 2024-02-09

## 2024-02-09 RX ORDER — BISMUTH SUBSALICYLATE 262 MG
1 TABLET,CHEWABLE ORAL DAILY
COMMUNITY
End: 2024-03-20 | Stop reason: WASHOUT

## 2024-02-09 ASSESSMENT — ENCOUNTER SYMPTOMS
PHOTOPHOBIA: 0
FREQUENCY: 0
HEADACHES: 0
DYSURIA: 0
CHILLS: 0
SHORTNESS OF BREATH: 0
DIARRHEA: 0
WEAKNESS: 0
PALPITATIONS: 0
ABDOMINAL PAIN: 0
TREMORS: 0
FEVER: 0
ARTHRALGIAS: 0
COLOR CHANGE: 0
COUGH: 0
BRUISES/BLEEDS EASILY: 0

## 2024-02-09 ASSESSMENT — PATIENT HEALTH QUESTIONNAIRE - PHQ9
2. FEELING DOWN, DEPRESSED OR HOPELESS: NOT AT ALL
1. LITTLE INTEREST OR PLEASURE IN DOING THINGS: NOT AT ALL
SUM OF ALL RESPONSES TO PHQ9 QUESTIONS 1 AND 2: 0

## 2024-02-09 ASSESSMENT — PAIN SCALES - GENERAL: PAINLEVEL: 7

## 2024-02-09 NOTE — PATIENT INSTRUCTIONS
yearly wellness 2/25    Diagnoses and all orders for this visit:  Routine medical exam  -     Lipid Panel; Future  -     Basic Metabolic Panel; Future  -     CBC; Future  -     Vitamin D 25-Hydroxy,Total (for eval of Vitamin D levels); Future  Anxiety  Comments:  mood wise OK.  Laboratory test  -     Lipid Panel; Future  -     Basic Metabolic Panel; Future  -     CBC; Future  -     Vitamin D 25-Hydroxy,Total (for eval of Vitamin D levels); Future  Vitamin D deficiency  -     Vitamin D 25-Hydroxy,Total (for eval of Vitamin D levels); Future  Strain of neck muscle, subsequent encounter  Comments:  , MRI spine pending. Tylenol 500 mg 2 caps 2-3 times a day( max dose 3000 mg in 24 hrs). PT done. Then ? [phone] injections. Ice/heat/ice 20 minutes each daily to upper back, right neck 5-7 days as needed.

## 2024-02-09 NOTE — PROGRESS NOTES
[facility]: [facility]  PHYSICAL EXAM      Abigail Mann is a 38 y.o. female that is presenting today for Annual Exam.    Assessment/Plan   Diagnoses and all orders for this visit:  Routine medical exam  -     Lipid Panel; Future  -     Basic Metabolic Panel; Future  -     CBC; Future  -     Vitamin D 25-Hydroxy,Total (for eval of Vitamin D levels); Future  Anxiety  Comments:  mood wise OK.  Orders:  -     sertraline (Zoloft) 25 mg tablet; Take 1 tablet (25 mg) by mouth once daily.  -     ALPRAZolam (Xanax) 0.25 mg tablet; Take 1 tablet (0.25 mg) by mouth once daily as needed for anxiety.  Laboratory test  -     Lipid Panel; Future  -     Basic Metabolic Panel; Future  -     CBC; Future  -     Vitamin D 25-Hydroxy,Total (for eval of Vitamin D levels); Future  Vitamin D deficiency  -     Vitamin D 25-Hydroxy,Total (for eval of Vitamin D levels); Future  Strain of neck muscle, subsequent encounter  Comments:  , MRI spine pending. Tylenol 500 mg 2 caps 2-3 times a day( max dose 3000 mg in 24 hrs). PT done. Then ? [phone] injections.  Anxiety  -     sertraline (Zoloft) 25 mg tablet; Take 1 tablet (25 mg) by mouth once daily.  -     ALPRAZolam (Xanax) 0.25 mg tablet; Take 1 tablet (0.25 mg) by mouth once daily as needed for anxiety.    Subjective   Wellness, anxiety doing OK. 12/23 miscarriage. Last 4/23 dog hit neck, chiropractor 1-2 moths no help.  orth. Herniated right, some HA. PT couple months. Came back, MRI [facility] diagnostic . follow up.      Review of Systems   Constitutional:  Negative for chills and fever.   HENT:  Negative for congestion.    Eyes:  Negative for photophobia and visual disturbance.   Respiratory:  Negative for cough and shortness of breath.    Cardiovascular:  Negative for chest pain and palpitations.   Gastrointestinal:  Negative for abdominal pain and diarrhea.   Endocrine: Negative for cold intolerance and heat intolerance.    Genitourinary:  Negative for dysuria and frequency.   Musculoskeletal:  Negative for arthralgias.   Skin:  Negative for color change.   Neurological:  Negative for tremors, weakness and headaches.   Hematological:  Does not bruise/bleed easily.      Objective   Vitals:    02/09/24 1139   BP: 103/70   Pulse: 76   Temp: 36.6 °C (97.9 °F)   SpO2: 99%     Body mass index is 21.74 kg/m².  Physical Exam  Constitutional:       General: She is not in acute distress.     Appearance: She is not toxic-appearing.   HENT:      Head: Normocephalic and atraumatic.      Right Ear: Tympanic membrane and ear canal normal.      Left Ear: Tympanic membrane and ear canal normal.      Nose: Nose normal.      Mouth/Throat:      Pharynx: Oropharynx is clear.   Eyes:      Extraocular Movements: Extraocular movements intact.      Pupils: Pupils are equal, round, and reactive to light.   Cardiovascular:      Rate and Rhythm: Normal rate and regular rhythm.      Heart sounds: Normal heart sounds. No murmur heard.  Pulmonary:      Breath sounds: Normal breath sounds.   Abdominal:      General: Bowel sounds are normal. There is no distension.      Palpations: Abdomen is soft. There is no mass.      Tenderness: There is no abdominal tenderness.   Musculoskeletal:         General: Tenderness (right trapezious strain, neck lateral rotation minor tenderness, N/V intact.) present. No swelling.      Right lower leg: No edema.      Left lower leg: No edema.   Skin:     General: Skin is warm and dry.   Neurological:      General: No focal deficit present.      Mental Status: She is oriented to person, place, and time.      Sensory: No sensory deficit.      Motor: No weakness.      Deep Tendon Reflexes: Reflexes normal.       Diagnostic Results   Lab Results   Component Value Date    GLUCOSE 93 09/27/2022    CALCIUM 9.5 09/27/2022     09/27/2022    K 4.2 09/27/2022    CO2 26 09/27/2022     09/27/2022    BUN 11 09/27/2022    CREATININE 0.9  "2022     Lab Results   Component Value Date    ALT 14 2022    AST 17 2022    ALKPHOS 66 2022    BILITOT 0.3 2022     Lab Results   Component Value Date    WBC 6.1 2022    HGB 12.3 2022    HCT 38.3 2022    MCV 94.3 2022     2022     Lab Results   Component Value Date    CHOL 199 2022    CHOL 196 2019     Lab Results   Component Value Date    HDL 67 2022    HDL 79 2019     Lab Results   Component Value Date    LDLCALC 113 2022    LDLCALC 101 2019     Lab Results   Component Value Date    TRIG 96 2022    TRIG 79 2019     No components found for: \"CHOLHDL\"  No results found for: \"HGBA1C\"  Other labs not included in the list above were reviewed either before or during this encounter.    History   Past Medical History:   Diagnosis Date    Arthralgia 2023    Costochondral junction syndrome 2023    Depression      Past Surgical History:   Procedure Laterality Date     SECTION, LOW TRANSVERSE  2020    x 2 & 2018    D&C FIRST TRIMESTER / TX INCOMPLETE / MISSED / SEPTIC / INDUCED       NASAL POLYP SURGERY Right 2020     Family History   Problem Relation Name Age of Onset    Breast cancer Mother Josselin     Colon cancer Father's Brother Ed     Breast cancer Paternal Grandmother Paternal grandmother      Social History     Socioeconomic History    Marital status:      Spouse name: Not on file    Number of children: Not on file    Years of education: Not on file    Highest education level: Not on file   Occupational History    Not on file   Tobacco Use    Smoking status: Never     Passive exposure: Never    Smokeless tobacco: Never   Vaping Use    Vaping Use: Never used   Substance and Sexual Activity    Alcohol use: Yes    Drug use: Never    Sexual activity: Yes     Partners: Male     Birth control/protection: None   Other Topics Concern    Not on file   Social History Narrative    " Not on file     Social Determinants of Health     Financial Resource Strain: Not on file   Food Insecurity: Not on file   Transportation Needs: Not on file   Physical Activity: Not on file   Stress: Not on file   Social Connections: Not on file   Intimate Partner Violence: Not on file     No Known Allergies  Current Outpatient Medications on File Prior to Visit   Medication Sig Dispense Refill    ascorbic acid (Vitamin C) 500 mg tablet Take by mouth.      cholecalciferol (Vitamin D-3) 25 MCG (1000 UT) tablet Take 1 tablet (25 mcg) by mouth once daily.      multivitamin tablet Take 1 tablet by mouth once daily.      [DISCONTINUED] ALPRAZolam (Xanax) 0.25 mg tablet Take 1 tablet (0.25 mg) by mouth once daily as needed for anxiety.      [DISCONTINUED] sertraline (Zoloft) 25 mg tablet TAKE ONE TABLET BY MOUTH ONCE A DAY 90 tablet 2     No current facility-administered medications on file prior to visit.     Immunization History   Administered Date(s) Administered    Flu vaccine, quadrivalent, recombinant, preservative free, adult (FLUBLOK) 10/12/2022    Influenza, injectable, quadrivalent 09/26/2018, 10/02/2019    Influenza, seasonal, injectable 11/04/2011    Moderna SARS-CoV-2 Vaccination 03/31/2021, 04/28/2021, 03/20/2022    Tdap vaccine, age 7 year and older (BOOSTRIX, ADACEL) 10/31/2012, 08/29/2018     Patient's medical history was reviewed and updated either before or during this encounter.       Juan Mejia MD

## 2024-02-12 ENCOUNTER — APPOINTMENT (OUTPATIENT)
Dept: RADIOLOGY | Facility: CLINIC | Age: 39
End: 2024-02-12
Payer: COMMERCIAL

## 2024-03-11 ENCOUNTER — TELEPHONE (OUTPATIENT)
Dept: OBSTETRICS AND GYNECOLOGY | Facility: CLINIC | Age: 39
End: 2024-03-11
Payer: COMMERCIAL

## 2024-03-11 DIAGNOSIS — N91.2 AMENORRHEA: Primary | ICD-10-CM

## 2024-03-11 NOTE — TELEPHONE ENCOUNTER
Pt with positive pregnancy test, LMP: 02/04.  Pt was advised to call with positive pregnancy test for lab work due to previous sab.  Please order labs

## 2024-03-12 ENCOUNTER — LAB (OUTPATIENT)
Dept: LAB | Facility: LAB | Age: 39
End: 2024-03-12
Payer: COMMERCIAL

## 2024-03-12 DIAGNOSIS — N91.2 AMENORRHEA: ICD-10-CM

## 2024-03-12 LAB — HCG SERPL-ACNC: NORMAL MIU/ML

## 2024-03-12 PROCEDURE — 84702 CHORIONIC GONADOTROPIN TEST: CPT

## 2024-03-12 PROCEDURE — 36415 COLL VENOUS BLD VENIPUNCTURE: CPT

## 2024-03-19 NOTE — PROGRESS NOTES
Subjective   Patient ID [id number]   Abigail Mann is a 38 y.o.  at Unknown with a working estimated date of delivery of Not found. who presents for an initial prenatal visit.     Her pregnancy is complicated by:  AMA    OB History    Para Term  AB Living   4 2 2 0 1 2   SAB IAB Ectopic Multiple Live Births   1 0 0 0 2      # Outcome Date GA Lbr Bobby/2nd Weight Sex Delivery Anes PTL Lv   4 Current            3 SAB 23 10w0d    Incomplete S      2 Term 20   3.175 kg F CS-LTranv   DESI   1 Term 10/30/18   3.544 kg M CS-LTranv   DESI          Objective   Physical Exam  Weight: 59.9 kg (132 lb)  Pregravid BMI: 20.08  BP: 124/76    Fetal Heart Rate: 129     PROCEDURE:  OB/GYN Transvaginal U/S    Intrauterine - yes  Yolk Sac - yes  Fetal Pole- single  FHT  129  EDC  2024  CRL  6w,4d     OBGyn Exam    General Physical Exam    HEENT: normal Heart: normal Skin: normal   Thyroid: normal Lungs: normal Extremities: normal   Lymph Nodes: normal Breasts: normal Neurological: normal   Abdomen: normal        Pelvic Exam    Vulva: normal Vagina: normal   Cervix: normal Adnexa: normal   Rectum: normal Spines: average   Subpubic Arch: normal       Prenatal Labs  Results for orders placed or performed in visit on 24   POCT UA Automated manually resulted   Result Value Ref Range    POC Color, Urine Yellow Straw, Yellow, Light-Yellow    POC Appearance, Urine Clear Clear    POC Glucose, Urine NEGATIVE NEGATIVE mg/dl    POC Bilirubin, Urine NEGATIVE NEGATIVE    POC Ketones, Urine TRACE (A) NEGATIVE mg/dl    POC Specific Gravity, Urine 1.020 1.005 - 1.035    POC Blood, Urine TRACE-Intact (A) NEGATIVE    POC PH, Urine 6.0 No Reference Range Established PH    POC Protein, Urine NEGATIVE NEGATIVE, 30 (1+) mg/dl    POC Urobilinogen, Urine 0.2 0.2, 1.0 EU/DL    Poc Nitrite, Urine NEGATIVE NEGATIVE    POC Leukocytes, Urine NEGATIVE NEGATIVE   POCT pregnancy, urine manually resulted   Result Value Ref  Range    Preg Test, Ur Positive (A) Negative         Assessment/Plan   Diagnoses and all orders for this visit:  Antepartum multigravida of advanced maternal age  Amenorrhea  Pregnancy examination or test, positive result  -     POCT UA Automated manually resulted  -     POCT pregnancy, urine manually resulted  -     Point of Care Ultrasound  -     CBC Anemia Panel With Reflex,Pregnancy; Future  -     Type And Screen; Future  -     Hepatitis B surface antigen; Future  -     Hepatitis C antibody; Future  -     Rubella Antibody, IgG; Future  -     Syphilis Screen with Reflex; Future  -     HIV 1/2 Antigen/Antibody Screen with Reflex to Confirmation; Future  -     Urine Culture  -     C. Trachomatis / N. Gonorrhoeae, Amplified Detection; Future    Immunizations: discussed  Prenatal Labs ordered  Daily prenatal vitamins prescribed  First trimester screening and second trimester screening discussed.  Follow up in 4 weeks for return OB visit.

## 2024-03-20 ENCOUNTER — INITIAL PRENATAL (OUTPATIENT)
Dept: OBSTETRICS AND GYNECOLOGY | Facility: CLINIC | Age: 39
End: 2024-03-20
Payer: COMMERCIAL

## 2024-03-20 VITALS — BODY MASS INDEX: 20.07 KG/M2 | WEIGHT: 132 LBS | SYSTOLIC BLOOD PRESSURE: 124 MMHG | DIASTOLIC BLOOD PRESSURE: 76 MMHG

## 2024-03-20 DIAGNOSIS — N91.2 AMENORRHEA: ICD-10-CM

## 2024-03-20 DIAGNOSIS — Z32.01 PREGNANCY EXAMINATION OR TEST, POSITIVE RESULT (HHS-HCC): ICD-10-CM

## 2024-03-20 DIAGNOSIS — O09.529 ANTEPARTUM MULTIGRAVIDA OF ADVANCED MATERNAL AGE (HHS-HCC): Primary | ICD-10-CM

## 2024-03-20 LAB
POC APPEARANCE, URINE: CLEAR
POC BILIRUBIN, URINE: NEGATIVE
POC BLOOD, URINE: ABNORMAL
POC COLOR, URINE: YELLOW
POC GLUCOSE, URINE: NEGATIVE MG/DL
POC KETONES, URINE: ABNORMAL MG/DL
POC LEUKOCYTES, URINE: NEGATIVE
POC NITRITE,URINE: NEGATIVE
POC PH, URINE: 6 PH
POC PROTEIN, URINE: NEGATIVE MG/DL
POC SPECIFIC GRAVITY, URINE: 1.02
POC UROBILINOGEN, URINE: 0.2 EU/DL
PREGNANCY TEST URINE, POC: POSITIVE

## 2024-03-20 PROCEDURE — 81003 URINALYSIS AUTO W/O SCOPE: CPT | Performed by: OBSTETRICS & GYNECOLOGY

## 2024-03-20 PROCEDURE — 76817 TRANSVAGINAL US OBSTETRIC: CPT | Performed by: OBSTETRICS & GYNECOLOGY

## 2024-03-20 PROCEDURE — 87800 DETECT AGNT MULT DNA DIREC: CPT | Performed by: OBSTETRICS & GYNECOLOGY

## 2024-03-20 PROCEDURE — 81025 URINE PREGNANCY TEST: CPT | Performed by: OBSTETRICS & GYNECOLOGY

## 2024-03-20 PROCEDURE — 87086 URINE CULTURE/COLONY COUNT: CPT | Performed by: OBSTETRICS & GYNECOLOGY

## 2024-03-20 PROCEDURE — 0500F INITIAL PRENATAL CARE VISIT: CPT | Performed by: OBSTETRICS & GYNECOLOGY

## 2024-03-20 RX ORDER — PNV NO.95/FERROUS FUM/FOLIC AC 28MG-0.8MG
1 TABLET ORAL DAILY
COMMUNITY

## 2024-03-20 RX ORDER — VIT C/ZINC CITRATE/ELDERBERRY 45 MG-50MG
1 TABLET,CHEWABLE ORAL DAILY
COMMUNITY

## 2024-03-20 ASSESSMENT — PATIENT HEALTH QUESTIONNAIRE - PHQ9
2. FEELING DOWN, DEPRESSED OR HOPELESS: NOT AT ALL
1. LITTLE INTEREST OR PLEASURE IN DOING THINGS: NOT AT ALL
SUM OF ALL RESPONSES TO PHQ9 QUESTIONS 1 & 2: 0

## 2024-03-20 ASSESSMENT — LIFESTYLE VARIABLES
HOW OFTEN DO YOU HAVE A DRINK CONTAINING ALCOHOL: MONTHLY OR LESS
HOW MANY STANDARD DRINKS CONTAINING ALCOHOL DO YOU HAVE ON A TYPICAL DAY: 1 OR 2
AUDIT-C TOTAL SCORE: 1
HOW OFTEN DO YOU HAVE SIX OR MORE DRINKS ON ONE OCCASION: NEVER
SKIP TO QUESTIONS 9-10: 1

## 2024-03-20 ASSESSMENT — ENCOUNTER SYMPTOMS
LOSS OF SENSATION IN FEET: 0
DEPRESSION: 0
OCCASIONAL FEELINGS OF UNSTEADINESS: 0

## 2024-03-20 ASSESSMENT — PAIN SCALES - GENERAL: PAINLEVEL_OUTOF10: 0

## 2024-03-21 LAB
BACTERIA UR CULT: NO GROWTH
C TRACH RRNA SPEC QL NAA+PROBE: NEGATIVE
N GONORRHOEA DNA SPEC QL PROBE+SIG AMP: NEGATIVE

## 2024-04-24 ENCOUNTER — ROUTINE PRENATAL (OUTPATIENT)
Dept: OBSTETRICS AND GYNECOLOGY | Facility: CLINIC | Age: 39
End: 2024-04-24
Payer: COMMERCIAL

## 2024-04-24 ENCOUNTER — LAB (OUTPATIENT)
Dept: LAB | Facility: LAB | Age: 39
End: 2024-04-24
Payer: COMMERCIAL

## 2024-04-24 VITALS — DIASTOLIC BLOOD PRESSURE: 70 MMHG | BODY MASS INDEX: 20.47 KG/M2 | SYSTOLIC BLOOD PRESSURE: 124 MMHG | WEIGHT: 134.6 LBS

## 2024-04-24 DIAGNOSIS — O09.529 ANTEPARTUM MULTIGRAVIDA OF ADVANCED MATERNAL AGE (HHS-HCC): Primary | ICD-10-CM

## 2024-04-24 DIAGNOSIS — O09.521 MULTIGRAVIDA OF ADVANCED MATERNAL AGE IN FIRST TRIMESTER (HHS-HCC): ICD-10-CM

## 2024-04-24 DIAGNOSIS — O34.219 PREVIOUS CESAREAN DELIVERY AFFECTING PREGNANCY (HHS-HCC): ICD-10-CM

## 2024-04-24 DIAGNOSIS — Z3A.11 11 WEEKS GESTATION OF PREGNANCY (HHS-HCC): ICD-10-CM

## 2024-04-24 DIAGNOSIS — Z32.01 PREGNANCY EXAMINATION OR TEST, POSITIVE RESULT (HHS-HCC): ICD-10-CM

## 2024-04-24 LAB
ABO GROUP (TYPE) IN BLOOD: NORMAL
ANTIBODY SCREEN: NORMAL
ERYTHROCYTE [DISTWIDTH] IN BLOOD BY AUTOMATED COUNT: 11.9 % (ref 11.5–14.5)
HCT VFR BLD AUTO: 36.7 % (ref 36–46)
HGB BLD-MCNC: 12.1 G/DL (ref 12–16)
MCH RBC QN AUTO: 30 PG (ref 26–34)
MCHC RBC AUTO-ENTMCNC: 33 G/DL (ref 32–36)
MCV RBC AUTO: 91 FL (ref 80–100)
NRBC BLD-RTO: 0 /100 WBCS (ref 0–0)
PLATELET # BLD AUTO: 223 X10*3/UL (ref 150–450)
POC APPEARANCE, URINE: CLEAR
POC BILIRUBIN, URINE: NEGATIVE
POC BLOOD, URINE: NEGATIVE
POC COLOR, URINE: YELLOW
POC GLUCOSE, URINE: NEGATIVE MG/DL
POC KETONES, URINE: NEGATIVE MG/DL
POC LEUKOCYTES, URINE: ABNORMAL
POC NITRITE,URINE: NEGATIVE
POC PH, URINE: 6 PH
POC PROTEIN, URINE: NEGATIVE MG/DL
POC SPECIFIC GRAVITY, URINE: 1.01
POC UROBILINOGEN, URINE: 0.2 EU/DL
RBC # BLD AUTO: 4.03 X10*6/UL (ref 4–5.2)
RH FACTOR (ANTIGEN D): NORMAL
WBC # BLD AUTO: 7.9 X10*3/UL (ref 4.4–11.3)

## 2024-04-24 PROCEDURE — 0501F PRENATAL FLOW SHEET: CPT | Performed by: OBSTETRICS & GYNECOLOGY

## 2024-04-24 PROCEDURE — 87340 HEPATITIS B SURFACE AG IA: CPT

## 2024-04-24 PROCEDURE — 86900 BLOOD TYPING SEROLOGIC ABO: CPT

## 2024-04-24 PROCEDURE — 86780 TREPONEMA PALLIDUM: CPT

## 2024-04-24 PROCEDURE — 36415 COLL VENOUS BLD VENIPUNCTURE: CPT

## 2024-04-24 PROCEDURE — 86901 BLOOD TYPING SEROLOGIC RH(D): CPT

## 2024-04-24 PROCEDURE — 85027 COMPLETE CBC AUTOMATED: CPT

## 2024-04-24 PROCEDURE — 86317 IMMUNOASSAY INFECTIOUS AGENT: CPT

## 2024-04-24 PROCEDURE — 86850 RBC ANTIBODY SCREEN: CPT

## 2024-04-24 PROCEDURE — 81003 URINALYSIS AUTO W/O SCOPE: CPT | Performed by: OBSTETRICS & GYNECOLOGY

## 2024-04-24 PROCEDURE — 87389 HIV-1 AG W/HIV-1&-2 AB AG IA: CPT

## 2024-04-24 PROCEDURE — 86803 HEPATITIS C AB TEST: CPT

## 2024-04-24 ASSESSMENT — ENCOUNTER SYMPTOMS
DEPRESSION: 0
LOSS OF SENSATION IN FEET: 0
OCCASIONAL FEELINGS OF UNSTEADINESS: 0

## 2024-04-24 ASSESSMENT — LIFESTYLE VARIABLES
SKIP TO QUESTIONS 9-10: 1
AUDIT-C TOTAL SCORE: 1
HOW OFTEN DO YOU HAVE SIX OR MORE DRINKS ON ONE OCCASION: NEVER
HOW MANY STANDARD DRINKS CONTAINING ALCOHOL DO YOU HAVE ON A TYPICAL DAY: 1 OR 2
HOW OFTEN DO YOU HAVE A DRINK CONTAINING ALCOHOL: MONTHLY OR LESS

## 2024-04-24 ASSESSMENT — PAIN SCALES - GENERAL: PAINLEVEL_OUTOF10: 0

## 2024-04-24 NOTE — PROGRESS NOTES
Subjective   Patient ID [id number]   Abigail Mann is a 38 y.o.  at 11w4d with a working estimated date of delivery of 2024, by Ultrasound who presents for a routine prenatal visit.     Objective   Physical Exam  Weight: 61.1 kg (134 lb 9.6 oz), Pregravid BMI: 20.08  BP: 124/70  Fetal Heart Rate: 162               Prenatal Labs  Urine dip:  Results for orders placed or performed in visit on 24   POCT UA Automated manually resulted   Result Value Ref Range    POC Color, Urine Yellow Straw, Yellow, Light-Yellow    POC Appearance, Urine Clear Clear    POC Glucose, Urine NEGATIVE NEGATIVE mg/dl    POC Bilirubin, Urine NEGATIVE NEGATIVE    POC Ketones, Urine NEGATIVE NEGATIVE mg/dl    POC Specific Gravity, Urine 1.010 1.005 - 1.035    POC Blood, Urine NEGATIVE NEGATIVE    POC PH, Urine 6.0 No Reference Range Established PH    POC Protein, Urine NEGATIVE NEGATIVE, 30 (1+) mg/dl    POC Urobilinogen, Urine 0.2 0.2, 1.0 EU/DL    Poc Nitrite, Urine NEGATIVE NEGATIVE    POC Leukocytes, Urine SMALL (1+) (A) NEGATIVE         Assessment/Plan   Diagnoses and all orders for this visit:  Antepartum multigravida of advanced maternal age ([facility])  -     Myriad Prequel Prenatal Screen; Future  11 weeks gestation of pregnancy ([facility])  -     POCT UA Automated manually resulted  Multigravida of advanced maternal age in first trimester ([facility])  Previous  delivery affecting pregnancy ([facility])    Continue prenatal vitamin.  Labs reviewed.      Follow up in 4 weeks for a routine prenatal visit.

## 2024-04-25 LAB
HBV SURFACE AG SERPL QL IA: NONREACTIVE
HCV AB SER QL: NONREACTIVE
HIV 1+2 AB+HIV1 P24 AG SERPL QL IA: NONREACTIVE
REFLEX ADDED, ANEMIA PANEL: NORMAL
RUBV IGG SERPL IA-ACNC: 2.1 IA
RUBV IGG SERPL QL IA: POSITIVE
TREPONEMA PALLIDUM IGG+IGM AB [PRESENCE] IN SERUM OR PLASMA BY IMMUNOASSAY: NONREACTIVE

## 2024-05-08 LAB — SCAN RESULT: NORMAL

## 2024-05-15 ENCOUNTER — ROUTINE PRENATAL (OUTPATIENT)
Dept: OBSTETRICS AND GYNECOLOGY | Facility: CLINIC | Age: 39
End: 2024-05-15
Payer: COMMERCIAL

## 2024-05-15 VITALS — BODY MASS INDEX: 20.89 KG/M2 | SYSTOLIC BLOOD PRESSURE: 112 MMHG | DIASTOLIC BLOOD PRESSURE: 68 MMHG | WEIGHT: 137.4 LBS

## 2024-05-15 DIAGNOSIS — O09.529 ANTEPARTUM MULTIGRAVIDA OF ADVANCED MATERNAL AGE (HHS-HCC): Primary | ICD-10-CM

## 2024-05-15 DIAGNOSIS — Z3A.14 14 WEEKS GESTATION OF PREGNANCY (HHS-HCC): ICD-10-CM

## 2024-05-15 DIAGNOSIS — O34.219 PREVIOUS CESAREAN DELIVERY AFFECTING PREGNANCY (HHS-HCC): ICD-10-CM

## 2024-05-15 LAB
POC APPEARANCE, URINE: CLEAR
POC BILIRUBIN, URINE: NEGATIVE
POC BLOOD, URINE: NEGATIVE
POC COLOR, URINE: YELLOW
POC GLUCOSE, URINE: NEGATIVE MG/DL
POC KETONES, URINE: NEGATIVE MG/DL
POC LEUKOCYTES, URINE: ABNORMAL
POC NITRITE,URINE: NEGATIVE
POC PH, URINE: 6 PH
POC PROTEIN, URINE: NEGATIVE MG/DL
POC SPECIFIC GRAVITY, URINE: 1.01
POC UROBILINOGEN, URINE: 0.2 EU/DL

## 2024-05-15 PROCEDURE — 81003 URINALYSIS AUTO W/O SCOPE: CPT | Performed by: OBSTETRICS & GYNECOLOGY

## 2024-05-15 PROCEDURE — 0501F PRENATAL FLOW SHEET: CPT | Performed by: OBSTETRICS & GYNECOLOGY

## 2024-05-15 ASSESSMENT — LIFESTYLE VARIABLES
SKIP TO QUESTIONS 9-10: 1
HOW MANY STANDARD DRINKS CONTAINING ALCOHOL DO YOU HAVE ON A TYPICAL DAY: PATIENT DOES NOT DRINK
AUDIT-C TOTAL SCORE: 0
HOW OFTEN DO YOU HAVE SIX OR MORE DRINKS ON ONE OCCASION: NEVER
HOW OFTEN DO YOU HAVE A DRINK CONTAINING ALCOHOL: NEVER

## 2024-05-15 ASSESSMENT — ENCOUNTER SYMPTOMS
OCCASIONAL FEELINGS OF UNSTEADINESS: 0
LOSS OF SENSATION IN FEET: 0
DEPRESSION: 0

## 2024-05-15 ASSESSMENT — PATIENT HEALTH QUESTIONNAIRE - PHQ9
1. LITTLE INTEREST OR PLEASURE IN DOING THINGS: NOT AT ALL
SUM OF ALL RESPONSES TO PHQ9 QUESTIONS 1 & 2: 0
2. FEELING DOWN, DEPRESSED OR HOPELESS: NOT AT ALL

## 2024-05-15 ASSESSMENT — PAIN - FUNCTIONAL ASSESSMENT: PAIN_FUNCTIONAL_ASSESSMENT: 0-10

## 2024-05-15 NOTE — PROGRESS NOTES
Subjective   Patient ID [id number]   Abigail Mann is a 38 y.o.  at 14w4d with a working estimated date of delivery of 2024, by Ultrasound who presents for a routine prenatal visit.       Objective   Physical Exam  Weight: 62.3 kg (137 lb 6.4 oz), Pregravid BMI: 20.08  BP: 112/68  Fetal Heart Rate: 155               Prenatal Labs  Urine dip:  Results for orders placed or performed in visit on 05/15/24   POCT UA Automated manually resulted   Result Value Ref Range    POC Color, Urine Yellow Straw, Yellow, Light-Yellow    POC Appearance, Urine Clear Clear    POC Glucose, Urine NEGATIVE NEGATIVE mg/dl    POC Bilirubin, Urine NEGATIVE NEGATIVE    POC Ketones, Urine NEGATIVE NEGATIVE mg/dl    POC Specific Gravity, Urine 1.015 1.005 - 1.035    POC Blood, Urine NEGATIVE NEGATIVE    POC PH, Urine 6.0 No Reference Range Established PH    POC Protein, Urine NEGATIVE NEGATIVE, 30 (1+) mg/dl    POC Urobilinogen, Urine 0.2 0.2, 1.0 EU/DL    Poc Nitrite, Urine NEGATIVE NEGATIVE    POC Leukocytes, Urine TRACE (A) NEGATIVE         Assessment/Plan   Diagnoses and all orders for this visit:  Antepartum multigravida of advanced maternal age ([facility])  -     US MAC OB imaging order; Future  Previous  delivery affecting pregnancy ([facility])  14 weeks gestation of pregnancy ([facility])  -     POCT UA Automated manually resulted    Continue prenatal vitamin.  Labs reviewed.      Follow up in 4 weeks for a routine prenatal visit.

## 2024-05-16 ENCOUNTER — APPOINTMENT (OUTPATIENT)
Dept: OBSTETRICS AND GYNECOLOGY | Facility: CLINIC | Age: 39
End: 2024-05-16
Payer: COMMERCIAL

## 2024-06-12 ENCOUNTER — ROUTINE PRENATAL (OUTPATIENT)
Dept: OBSTETRICS AND GYNECOLOGY | Facility: CLINIC | Age: 39
End: 2024-06-12
Payer: COMMERCIAL

## 2024-06-12 VITALS — DIASTOLIC BLOOD PRESSURE: 62 MMHG | WEIGHT: 140 LBS | BODY MASS INDEX: 21.29 KG/M2 | SYSTOLIC BLOOD PRESSURE: 110 MMHG

## 2024-06-12 DIAGNOSIS — Z3A.18 18 WEEKS GESTATION OF PREGNANCY (HHS-HCC): ICD-10-CM

## 2024-06-12 DIAGNOSIS — O09.529 ANTEPARTUM MULTIGRAVIDA OF ADVANCED MATERNAL AGE (HHS-HCC): ICD-10-CM

## 2024-06-12 DIAGNOSIS — O34.219 PREVIOUS CESAREAN DELIVERY AFFECTING PREGNANCY (HHS-HCC): ICD-10-CM

## 2024-06-12 LAB
POC APPEARANCE, URINE: CLEAR
POC BILIRUBIN, URINE: NEGATIVE
POC BLOOD, URINE: NEGATIVE
POC COLOR, URINE: YELLOW
POC GLUCOSE, URINE: NEGATIVE MG/DL
POC KETONES, URINE: NEGATIVE MG/DL
POC LEUKOCYTES, URINE: NEGATIVE
POC NITRITE,URINE: NEGATIVE
POC PH, URINE: 6.5 PH
POC PROTEIN, URINE: NEGATIVE MG/DL
POC SPECIFIC GRAVITY, URINE: 1.01
POC UROBILINOGEN, URINE: 0.2 EU/DL

## 2024-06-12 PROCEDURE — 0501F PRENATAL FLOW SHEET: CPT | Performed by: OBSTETRICS & GYNECOLOGY

## 2024-06-12 PROCEDURE — 81003 URINALYSIS AUTO W/O SCOPE: CPT | Performed by: OBSTETRICS & GYNECOLOGY

## 2024-06-12 ASSESSMENT — PAIN SCALES - GENERAL: PAINLEVEL_OUTOF10: 0 - NO PAIN

## 2024-06-12 ASSESSMENT — LIFESTYLE VARIABLES
HOW MANY STANDARD DRINKS CONTAINING ALCOHOL DO YOU HAVE ON A TYPICAL DAY: PATIENT DOES NOT DRINK
SKIP TO QUESTIONS 9-10: 1
HOW OFTEN DO YOU HAVE A DRINK CONTAINING ALCOHOL: NEVER
HOW OFTEN DO YOU HAVE SIX OR MORE DRINKS ON ONE OCCASION: NEVER
AUDIT-C TOTAL SCORE: 0

## 2024-06-12 ASSESSMENT — PATIENT HEALTH QUESTIONNAIRE - PHQ9
2. FEELING DOWN, DEPRESSED OR HOPELESS: NOT AT ALL
SUM OF ALL RESPONSES TO PHQ9 QUESTIONS 1 & 2: 0
1. LITTLE INTEREST OR PLEASURE IN DOING THINGS: NOT AT ALL

## 2024-06-12 ASSESSMENT — ENCOUNTER SYMPTOMS
LOSS OF SENSATION IN FEET: 0
DEPRESSION: 0
OCCASIONAL FEELINGS OF UNSTEADINESS: 0

## 2024-06-12 ASSESSMENT — PAIN - FUNCTIONAL ASSESSMENT: PAIN_FUNCTIONAL_ASSESSMENT: 0-10

## 2024-06-12 NOTE — PROGRESS NOTES
Subjective   Patient ID [id number]   Abigail Mann is a 38 y.o.  at 18w4d with a working estimated date of delivery of 2024, by Ultrasound who presents for a routine prenatal visit.     Objective   Physical Exam  Weight: 63.5 kg (140 lb)  Pregravid BMI: 20.08  BP: 110/62  Fetal Heart Rate: 140               Prenatal Labs  Urine dip:  Results for orders placed or performed in visit on 24   POCT UA Automated manually resulted   Result Value Ref Range    POC Color, Urine Yellow Straw, Yellow, Light-Yellow    POC Appearance, Urine Clear Clear    POC Glucose, Urine NEGATIVE NEGATIVE mg/dl    POC Bilirubin, Urine NEGATIVE NEGATIVE    POC Ketones, Urine NEGATIVE NEGATIVE mg/dl    POC Specific Gravity, Urine 1.010 1.005 - 1.035    POC Blood, Urine NEGATIVE NEGATIVE    POC PH, Urine 6.5 No Reference Range Established PH    POC Protein, Urine NEGATIVE NEGATIVE, 30 (1+) mg/dl    POC Urobilinogen, Urine 0.2 0.2, 1.0 EU/DL    Poc Nitrite, Urine NEGATIVE NEGATIVE    POC Leukocytes, Urine NEGATIVE NEGATIVE           Assessment/Plan   Diagnoses and all orders for this visit:  Antepartum multigravida of advanced maternal age ([facility])  Previous  delivery affecting pregnancy ([facility])  18 weeks gestation of pregnancy ([facility])  -     POCT UA Automated manually resulted    Continue prenatal vitamin.  Labs reviewed.  Schedule anatomy ultrasound.      Follow up in 4 weeks for a routine prenatal visit.

## 2024-07-03 ENCOUNTER — HOSPITAL ENCOUNTER (OUTPATIENT)
Dept: RADIOLOGY | Facility: CLINIC | Age: 39
Discharge: HOME | End: 2024-07-03
Payer: COMMERCIAL

## 2024-07-03 DIAGNOSIS — O09.529 ANTEPARTUM MULTIGRAVIDA OF ADVANCED MATERNAL AGE (HHS-HCC): ICD-10-CM

## 2024-07-03 PROCEDURE — 76811 OB US DETAILED SNGL FETUS: CPT

## 2024-07-03 PROCEDURE — 76811 OB US DETAILED SNGL FETUS: CPT | Performed by: OBSTETRICS & GYNECOLOGY

## 2024-07-09 NOTE — PROGRESS NOTES
Subjective   Patient ID [id number]   Abigail Mann is a 38 y.o.  at 22w3d with a working estimated date of delivery of 2024, by Ultrasound who presents for a routine prenatal visit.       Objective   Physical Exam  Weight: 65.2 kg (143 lb 12.8 oz)  Pregravid BMI: 20.08  BP: 100/62  Fetal Heart Rate: 155 Fundal Height (cm): 22 cm             Prenatal Labs  Urine dip:  Results for orders placed or performed in visit on 07/10/24   POCT UA Automated manually resulted   Result Value Ref Range    POC Color, Urine Yellow Straw, Yellow, Light-Yellow    POC Appearance, Urine Clear Clear    POC Glucose, Urine NEGATIVE NEGATIVE mg/dl    POC Bilirubin, Urine NEGATIVE NEGATIVE    POC Ketones, Urine NEGATIVE NEGATIVE mg/dl    POC Specific Gravity, Urine 1.015 1.005 - 1.035    POC Blood, Urine NEGATIVE NEGATIVE    POC PH, Urine 7.0 No Reference Range Established PH    POC Protein, Urine NEGATIVE NEGATIVE, 30 (1+) mg/dl    POC Urobilinogen, Urine 0.2 0.2, 1.0 EU/DL    Poc Nitrite, Urine NEGATIVE NEGATIVE    POC Leukocytes, Urine MODERATE (2+) (A) NEGATIVE           Assessment/Plan   Diagnoses and all orders for this visit:  Antepartum multigravida of advanced maternal age ([facility])  Previous  delivery affecting pregnancy ([facility])  22 weeks gestation of pregnancy ([facility])  -     POCT UA Automated manually resulted  -     CBC; Future  -     Glucose, 1 Hour Screen, Pregnancy; Future    Continue prenatal vitamin.  Labs reviewed.  Schedule anatomy ultrasound.      Follow up in 4 weeks for a routine prenatal visit.

## 2024-07-10 ENCOUNTER — ROUTINE PRENATAL (OUTPATIENT)
Dept: OBSTETRICS AND GYNECOLOGY | Facility: CLINIC | Age: 39
End: 2024-07-10
Payer: COMMERCIAL

## 2024-07-10 VITALS — BODY MASS INDEX: 21.86 KG/M2 | DIASTOLIC BLOOD PRESSURE: 62 MMHG | SYSTOLIC BLOOD PRESSURE: 100 MMHG | WEIGHT: 143.8 LBS

## 2024-07-10 DIAGNOSIS — O34.219 PREVIOUS CESAREAN DELIVERY AFFECTING PREGNANCY (HHS-HCC): ICD-10-CM

## 2024-07-10 DIAGNOSIS — O09.529 ANTEPARTUM MULTIGRAVIDA OF ADVANCED MATERNAL AGE (HHS-HCC): Primary | ICD-10-CM

## 2024-07-10 DIAGNOSIS — Z3A.22 22 WEEKS GESTATION OF PREGNANCY (HHS-HCC): ICD-10-CM

## 2024-07-10 LAB
POC APPEARANCE, URINE: CLEAR
POC BILIRUBIN, URINE: NEGATIVE
POC BLOOD, URINE: NEGATIVE
POC COLOR, URINE: YELLOW
POC GLUCOSE, URINE: NEGATIVE MG/DL
POC KETONES, URINE: NEGATIVE MG/DL
POC LEUKOCYTES, URINE: ABNORMAL
POC NITRITE,URINE: NEGATIVE
POC PH, URINE: 7 PH
POC PROTEIN, URINE: NEGATIVE MG/DL
POC SPECIFIC GRAVITY, URINE: 1.01
POC UROBILINOGEN, URINE: 0.2 EU/DL

## 2024-07-10 PROCEDURE — 81003 URINALYSIS AUTO W/O SCOPE: CPT | Mod: QW | Performed by: OBSTETRICS & GYNECOLOGY

## 2024-07-10 PROCEDURE — 0501F PRENATAL FLOW SHEET: CPT | Performed by: OBSTETRICS & GYNECOLOGY

## 2024-07-10 ASSESSMENT — PAIN SCALES - GENERAL: PAINLEVEL_OUTOF10: 0 - NO PAIN

## 2024-07-10 ASSESSMENT — ENCOUNTER SYMPTOMS
DEPRESSION: 0
LOSS OF SENSATION IN FEET: 0
OCCASIONAL FEELINGS OF UNSTEADINESS: 0

## 2024-07-10 ASSESSMENT — PAIN - FUNCTIONAL ASSESSMENT: PAIN_FUNCTIONAL_ASSESSMENT: 0-10

## 2024-07-30 ENCOUNTER — TELEPHONE (OUTPATIENT)
Dept: OBSTETRICS AND GYNECOLOGY | Facility: CLINIC | Age: 39
End: 2024-07-30
Payer: COMMERCIAL

## 2024-07-30 NOTE — TELEPHONE ENCOUNTER
25 weeks pregnant.  Pt saw derm and was given a clindamycin moisturizer for her acne and rash on her face.  She wanted to make sure this was ok to use.  Also she a a cortisone injection for a keloid on her arm and he is wanting to do a laser treatment in a few weeks.  Pt is wanting to make sure it was safe during pregnancy

## 2024-08-10 NOTE — PROGRESS NOTES
Subjective   Patient ID [id number]   Abigail Mann is a 38 y.o.  at 27w0d with a working estimated date of delivery of 2024, by Ultrasound who presents for a routine prenatal visit. She denies vaginal bleeding, leakage of fluid, decreased fetal movements, or contractions.      Objective   Physical Exam  Weight: 70.7 kg (155 lb 12.8 oz)  Pregravid BMI: 20.08  BP: 110/62  Fetal Heart Rate: 135 Fundal Height (cm): 27 cm Presentation: Vertex             Prenatal Labs  Urine dip:  Results for orders placed or performed in visit on 24   POCT UA Automated manually resulted   Result Value Ref Range    POC Color, Urine Yellow Straw, Yellow, Light-Yellow    POC Appearance, Urine Clear Clear    POC Glucose, Urine NEGATIVE NEGATIVE mg/dl    POC Bilirubin, Urine NEGATIVE NEGATIVE    POC Ketones, Urine NEGATIVE NEGATIVE mg/dl    POC Specific Gravity, Urine 1.010 1.005 - 1.035    POC Blood, Urine NEGATIVE NEGATIVE    POC PH, Urine 6.5 No Reference Range Established PH    POC Protein, Urine NEGATIVE NEGATIVE, 30 (1+) mg/dl    POC Urobilinogen, Urine 0.2 0.2, 1.0 EU/DL    Poc Nitrite, Urine NEGATIVE NEGATIVE    POC Leukocytes, Urine TRACE (A) NEGATIVE       Imaging scheduled    Assessment/Plan   Diagnoses and all orders for this visit:  Antepartum multigravida of advanced maternal age ([facility])  -     US MAC OB imaging order; Future  Previous  delivery affecting pregnancy ([facility])  27 weeks gestation of pregnancy ([facility])  -     POCT UA Automated manually resulted    Continue prenatal vitamin.  Labs reviewed.  Movement counts   labor precautions  Rhogam if indicated  GTT .    Follow up in 2 weeks for a routine prenatal visit.

## 2024-08-12 ENCOUNTER — ROUTINE PRENATAL (OUTPATIENT)
Dept: OBSTETRICS AND GYNECOLOGY | Facility: CLINIC | Age: 39
End: 2024-08-12
Payer: COMMERCIAL

## 2024-08-12 ENCOUNTER — LAB (OUTPATIENT)
Dept: LAB | Facility: LAB | Age: 39
End: 2024-08-12
Payer: COMMERCIAL

## 2024-08-12 VITALS — DIASTOLIC BLOOD PRESSURE: 62 MMHG | BODY MASS INDEX: 23.69 KG/M2 | WEIGHT: 155.8 LBS | SYSTOLIC BLOOD PRESSURE: 110 MMHG

## 2024-08-12 DIAGNOSIS — Z3A.27 27 WEEKS GESTATION OF PREGNANCY (HHS-HCC): ICD-10-CM

## 2024-08-12 DIAGNOSIS — Z3A.22 22 WEEKS GESTATION OF PREGNANCY (HHS-HCC): ICD-10-CM

## 2024-08-12 DIAGNOSIS — O34.219 PREVIOUS CESAREAN DELIVERY AFFECTING PREGNANCY (HHS-HCC): ICD-10-CM

## 2024-08-12 DIAGNOSIS — O09.529 ANTEPARTUM MULTIGRAVIDA OF ADVANCED MATERNAL AGE (HHS-HCC): Primary | ICD-10-CM

## 2024-08-12 LAB
ERYTHROCYTE [DISTWIDTH] IN BLOOD BY AUTOMATED COUNT: 12.4 % (ref 11.5–14.5)
GLUCOSE 1H P GLC SERPL-MCNC: 161 MG/DL
HCT VFR BLD AUTO: 31.4 % (ref 36–46)
HGB BLD-MCNC: 10.7 G/DL (ref 12–16)
MCH RBC QN AUTO: 30.8 PG (ref 26–34)
MCHC RBC AUTO-ENTMCNC: 34.1 G/DL (ref 32–36)
MCV RBC AUTO: 91 FL (ref 80–100)
NRBC BLD-RTO: 0 /100 WBCS (ref 0–0)
PLATELET # BLD AUTO: 210 X10*3/UL (ref 150–450)
POC APPEARANCE, URINE: CLEAR
POC BILIRUBIN, URINE: NEGATIVE
POC BLOOD, URINE: NEGATIVE
POC COLOR, URINE: YELLOW
POC GLUCOSE, URINE: NEGATIVE MG/DL
POC KETONES, URINE: NEGATIVE MG/DL
POC LEUKOCYTES, URINE: ABNORMAL
POC NITRITE,URINE: NEGATIVE
POC PH, URINE: 6.5 PH
POC PROTEIN, URINE: NEGATIVE MG/DL
POC SPECIFIC GRAVITY, URINE: 1.01
POC UROBILINOGEN, URINE: 0.2 EU/DL
RBC # BLD AUTO: 3.47 X10*6/UL (ref 4–5.2)
WBC # BLD AUTO: 8.2 X10*3/UL (ref 4.4–11.3)

## 2024-08-12 PROCEDURE — 0501F PRENATAL FLOW SHEET: CPT | Performed by: OBSTETRICS & GYNECOLOGY

## 2024-08-12 PROCEDURE — 36415 COLL VENOUS BLD VENIPUNCTURE: CPT

## 2024-08-12 PROCEDURE — 82947 ASSAY GLUCOSE BLOOD QUANT: CPT

## 2024-08-12 PROCEDURE — 81003 URINALYSIS AUTO W/O SCOPE: CPT | Performed by: OBSTETRICS & GYNECOLOGY

## 2024-08-12 PROCEDURE — 85027 COMPLETE CBC AUTOMATED: CPT

## 2024-08-12 ASSESSMENT — ENCOUNTER SYMPTOMS
DEPRESSION: 0
OCCASIONAL FEELINGS OF UNSTEADINESS: 0
LOSS OF SENSATION IN FEET: 0

## 2024-08-12 ASSESSMENT — LIFESTYLE VARIABLES
HOW OFTEN DO YOU HAVE A DRINK CONTAINING ALCOHOL: MONTHLY OR LESS
HOW OFTEN DO YOU HAVE SIX OR MORE DRINKS ON ONE OCCASION: NEVER
HOW MANY STANDARD DRINKS CONTAINING ALCOHOL DO YOU HAVE ON A TYPICAL DAY: 1 OR 2
SKIP TO QUESTIONS 9-10: 1
AUDIT-C TOTAL SCORE: 1

## 2024-08-12 ASSESSMENT — PAIN SCALES - GENERAL: PAINLEVEL_OUTOF10: 0 - NO PAIN

## 2024-08-12 ASSESSMENT — PAIN - FUNCTIONAL ASSESSMENT: PAIN_FUNCTIONAL_ASSESSMENT: 0-10

## 2024-08-13 DIAGNOSIS — R73.09 GLUCOSE TOLERANCE TEST ABNORMAL: ICD-10-CM

## 2024-08-13 DIAGNOSIS — O99.019 ANEMIA AFFECTING PREGNANCY, ANTEPARTUM (HHS-HCC): Primary | ICD-10-CM

## 2024-08-19 ENCOUNTER — LAB (OUTPATIENT)
Dept: LAB | Facility: LAB | Age: 39
End: 2024-08-19
Payer: COMMERCIAL

## 2024-08-19 DIAGNOSIS — R73.09 GLUCOSE TOLERANCE TEST ABNORMAL: ICD-10-CM

## 2024-08-19 DIAGNOSIS — O99.019 ANEMIA AFFECTING PREGNANCY, ANTEPARTUM (HHS-HCC): ICD-10-CM

## 2024-08-19 LAB
FERRITIN SERPL-MCNC: 40 NG/ML (ref 13–150)
FOLATE SERPL-MCNC: >20 NG/ML (ref 4.2–19.9)
GLUCOSE 1H P GLC SERPL-MCNC: 158 MG/DL
GLUCOSE 2H P GLC SERPL-MCNC: 167 MG/DL
GLUCOSE 3H P GLC SERPL-MCNC: 116 MG/DL
GLUCOSE P FAST SERPL-MCNC: 68 MG/DL
IRON SATN MFR SERPL: 18 % (ref 12–50)
IRON SERPL-MCNC: 88 UG/DL (ref 30–160)
TIBC SERPL-MCNC: 491 UG/DL (ref 228–428)
UIBC SERPL-MCNC: 403 UG/DL (ref 110–370)
VIT B12 SERPL-MCNC: 944 PG/ML (ref 211–946)

## 2024-08-19 PROCEDURE — 36415 COLL VENOUS BLD VENIPUNCTURE: CPT

## 2024-08-19 PROCEDURE — 82728 ASSAY OF FERRITIN: CPT

## 2024-08-19 PROCEDURE — 83550 IRON BINDING TEST: CPT

## 2024-08-19 PROCEDURE — 82746 ASSAY OF FOLIC ACID SERUM: CPT

## 2024-08-19 PROCEDURE — 82951 GLUCOSE TOLERANCE TEST (GTT): CPT

## 2024-08-19 PROCEDURE — 82607 VITAMIN B-12: CPT

## 2024-08-19 PROCEDURE — 82950 GLUCOSE TEST: CPT

## 2024-08-19 PROCEDURE — 82947 ASSAY GLUCOSE BLOOD QUANT: CPT

## 2024-08-19 PROCEDURE — 83540 ASSAY OF IRON: CPT

## 2024-08-19 PROCEDURE — 82952 GTT-ADDED SAMPLES: CPT

## 2024-09-06 ENCOUNTER — HOSPITAL ENCOUNTER (OUTPATIENT)
Dept: RADIOLOGY | Facility: CLINIC | Age: 39
Discharge: HOME | End: 2024-09-06
Payer: COMMERCIAL

## 2024-09-06 DIAGNOSIS — O09.529 ANTEPARTUM MULTIGRAVIDA OF ADVANCED MATERNAL AGE (HHS-HCC): ICD-10-CM

## 2024-09-06 PROCEDURE — 76816 OB US FOLLOW-UP PER FETUS: CPT

## 2024-09-11 ENCOUNTER — APPOINTMENT (OUTPATIENT)
Dept: OBSTETRICS AND GYNECOLOGY | Facility: CLINIC | Age: 39
End: 2024-09-11
Payer: COMMERCIAL

## 2024-09-11 NOTE — PROGRESS NOTES
Subjective   Patient ID [id number]   Abigail Mann is a 38 y.o.  at 31w4d with a working estimated date of delivery of 2024, by Ultrasound who presents for a routine prenatal visit. She denies vaginal bleeding, leakage of fluid, decreased fetal movements, or contractions.      Objective   Physical Exam:   Weight: 69.9 kg (154 lb 3.2 oz)    Pregravid BMI: 20.08    BP: 128/66  Fetal Heart Rate: 125 Fundal Height (cm): 32 cm Presentation: Vertex           Prenatal Labs  Urine Dip  Results for orders placed or performed in visit on 24   Vitamin B12   Result Value Ref Range    Vitamin B12 944 211 - 946 pg/mL   Folate   Result Value Ref Range    Folate, Serum >20.0 (H) 4.2 - 19.9 ng/mL   Ferritin   Result Value Ref Range    Ferritin 40 13 - 150 ng/mL   Iron and TIBC   Result Value Ref Range    Iron 88 30 - 160 ug/dL    UIBC 403 (H) 110 - 370 ug/dL    TIBC 491 (H) 228 - 428 ug/dL    % Saturation 18 12 - 50 %   Glucose, Fasting   Result Value Ref Range    Glucose, Fasting 68 <95 mg/dL   Glucose, One Hour   Result Value Ref Range    Glucose, One hour 158 <180 mg/dL   Glucose, 2 hour   Result Value Ref Range    Glucose, Two hour 167 (H) <155 mg/dL   Glucose, Three hour   Result Value Ref Range    Glucose, Three hour 116 <140 mg/dL       Assessment/Plan   Diagnoses and all orders for this visit:  Antepartum multigravida of advanced maternal age ([facility])  -     POCT UA Automated manually resulted  Previous  delivery affecting pregnancy ([facility])  Anemia affecting pregnancy, antepartum ([facility])  31 weeks gestation of pregnancy ([facility])  Other orders  -     Tdap vaccine, age 7 years and older    Continue prenatal vitamin.  Labs reviewed.  Labor precautions given.  Movement counts  Follow up in 2 week for a routine prenatal visit.

## 2024-09-12 ENCOUNTER — ROUTINE PRENATAL (OUTPATIENT)
Dept: OBSTETRICS AND GYNECOLOGY | Facility: CLINIC | Age: 39
End: 2024-09-12
Payer: COMMERCIAL

## 2024-09-12 ENCOUNTER — HOSPITAL ENCOUNTER (OUTPATIENT)
Facility: HOSPITAL | Age: 39
Setting detail: SURGERY ADMIT
End: 2024-09-12
Attending: OBSTETRICS & GYNECOLOGY | Admitting: OBSTETRICS & GYNECOLOGY
Payer: COMMERCIAL

## 2024-09-12 ENCOUNTER — PREP FOR PROCEDURE (OUTPATIENT)
Dept: OBSTETRICS AND GYNECOLOGY | Facility: HOSPITAL | Age: 39
End: 2024-09-12

## 2024-09-12 VITALS — WEIGHT: 154.2 LBS | SYSTOLIC BLOOD PRESSURE: 128 MMHG | DIASTOLIC BLOOD PRESSURE: 66 MMHG | BODY MASS INDEX: 23.45 KG/M2

## 2024-09-12 DIAGNOSIS — O34.219 PREVIOUS CESAREAN SECTION COMPLICATING PREGNANCY (HHS-HCC): Primary | ICD-10-CM

## 2024-09-12 DIAGNOSIS — O09.529 ANTEPARTUM MULTIGRAVIDA OF ADVANCED MATERNAL AGE (HHS-HCC): Primary | ICD-10-CM

## 2024-09-12 DIAGNOSIS — O99.019 ANEMIA AFFECTING PREGNANCY, ANTEPARTUM (HHS-HCC): ICD-10-CM

## 2024-09-12 DIAGNOSIS — O34.219 PREVIOUS CESAREAN DELIVERY AFFECTING PREGNANCY (HHS-HCC): ICD-10-CM

## 2024-09-12 DIAGNOSIS — Z3A.31 31 WEEKS GESTATION OF PREGNANCY (HHS-HCC): ICD-10-CM

## 2024-09-12 DIAGNOSIS — Z30.2 STERILIZATION: ICD-10-CM

## 2024-09-12 PROCEDURE — 90715 TDAP VACCINE 7 YRS/> IM: CPT | Performed by: OBSTETRICS & GYNECOLOGY

## 2024-09-12 PROCEDURE — 0501F PRENATAL FLOW SHEET: CPT | Performed by: OBSTETRICS & GYNECOLOGY

## 2024-09-12 RX ORDER — CEFAZOLIN SODIUM 2 G/100ML
2 INJECTION, SOLUTION INTRAVENOUS ONCE
OUTPATIENT
Start: 2024-09-12 | End: 2024-09-12

## 2024-09-12 RX ORDER — SODIUM CHLORIDE, SODIUM LACTATE, POTASSIUM CHLORIDE, CALCIUM CHLORIDE 600; 310; 30; 20 MG/100ML; MG/100ML; MG/100ML; MG/100ML
20 INJECTION, SOLUTION INTRAVENOUS CONTINUOUS
OUTPATIENT
Start: 2024-09-12

## 2024-09-12 RX ORDER — FAMOTIDINE 10 MG/ML
20 INJECTION INTRAVENOUS ONCE
OUTPATIENT
Start: 2024-09-12 | End: 2024-09-12

## 2024-09-12 ASSESSMENT — LIFESTYLE VARIABLES
AUDIT-C TOTAL SCORE: 1
HOW OFTEN DO YOU HAVE A DRINK CONTAINING ALCOHOL: MONTHLY OR LESS
HOW OFTEN DO YOU HAVE SIX OR MORE DRINKS ON ONE OCCASION: NEVER
HOW MANY STANDARD DRINKS CONTAINING ALCOHOL DO YOU HAVE ON A TYPICAL DAY: 1 OR 2
SKIP TO QUESTIONS 9-10: 1

## 2024-09-12 ASSESSMENT — ENCOUNTER SYMPTOMS
OCCASIONAL FEELINGS OF UNSTEADINESS: 0
LOSS OF SENSATION IN FEET: 0
DEPRESSION: 0

## 2024-09-12 ASSESSMENT — PAIN - FUNCTIONAL ASSESSMENT: PAIN_FUNCTIONAL_ASSESSMENT: 0-10

## 2024-09-12 ASSESSMENT — PAIN SCALES - GENERAL: PAINLEVEL_OUTOF10: 0 - NO PAIN

## 2024-09-24 NOTE — PROGRESS NOTES
Subjective   Patient ID [id number]   Abigail Mann is a 38 y.o.  at 33w5d with a working estimated date of delivery of 2024, by Ultrasound who presents for a routine prenatal visit. She denies vaginal bleeding, leakage of fluid, decreased fetal movements, or contractions.    Objective   Physical Exam:   Weight: 70.4 kg (155 lb 3.2 oz)    Pregravid BMI: 20.08    BP: 112/68  Fetal Heart Rate: positive   Presentation: Vertex           Prenatal Labs  Urine Dip  Results for orders placed or performed in visit on 24   POCT UA Automated manually resulted   Result Value Ref Range    POC Color, Urine Yellow Straw, Yellow, Light-Yellow    POC Appearance, Urine Clear Clear    POC Glucose, Urine NEGATIVE NEGATIVE mg/dl    POC Bilirubin, Urine NEGATIVE NEGATIVE    POC Ketones, Urine NEGATIVE NEGATIVE mg/dl    POC Specific Gravity, Urine 1.020 1.005 - 1.035    POC Blood, Urine NEGATIVE NEGATIVE    POC PH, Urine 7.0 No Reference Range Established PH    POC Protein, Urine NEGATIVE NEGATIVE, 30 (1+) mg/dl    POC Urobilinogen, Urine 0.2 0.2, 1.0 EU/DL    Poc Nitrite, Urine NEGATIVE NEGATIVE    POC Leukocytes, Urine SMALL (1+) (A) NEGATIVE         Assessment/Plan   Diagnoses and all orders for this visit:  Antepartum multigravida of advanced maternal age ([facility])  Previous  delivery affecting pregnancy ([facility])  Anemia affecting pregnancy, antepartum ([facility])  33 weeks gestation of pregnancy ([facility])  -     POCT UA Automated manually resulted  Other orders  -     Respiratory Syncytial Virus (RSV), Eligible Pregnant Pts, 0.5 mL (ABRYSVO)    Continue prenatal vitamin.  Labs reviewed.  Labor precautions given.  Movement counts  Follow up in 2 week for a routine prenatal visit.

## 2024-09-26 ENCOUNTER — ROUTINE PRENATAL (OUTPATIENT)
Dept: OBSTETRICS AND GYNECOLOGY | Facility: CLINIC | Age: 39
End: 2024-09-26
Payer: COMMERCIAL

## 2024-09-26 VITALS — DIASTOLIC BLOOD PRESSURE: 68 MMHG | WEIGHT: 155.2 LBS | SYSTOLIC BLOOD PRESSURE: 112 MMHG | BODY MASS INDEX: 23.6 KG/M2

## 2024-09-26 DIAGNOSIS — O99.019 ANEMIA AFFECTING PREGNANCY, ANTEPARTUM (HHS-HCC): ICD-10-CM

## 2024-09-26 DIAGNOSIS — Z3A.33 33 WEEKS GESTATION OF PREGNANCY (HHS-HCC): ICD-10-CM

## 2024-09-26 DIAGNOSIS — O09.529 ANTEPARTUM MULTIGRAVIDA OF ADVANCED MATERNAL AGE (HHS-HCC): Primary | ICD-10-CM

## 2024-09-26 DIAGNOSIS — O34.219 PREVIOUS CESAREAN DELIVERY AFFECTING PREGNANCY (HHS-HCC): ICD-10-CM

## 2024-09-26 LAB
POC APPEARANCE, URINE: CLEAR
POC BILIRUBIN, URINE: NEGATIVE
POC BLOOD, URINE: NEGATIVE
POC COLOR, URINE: YELLOW
POC GLUCOSE, URINE: NEGATIVE MG/DL
POC KETONES, URINE: NEGATIVE MG/DL
POC LEUKOCYTES, URINE: ABNORMAL
POC NITRITE,URINE: NEGATIVE
POC PH, URINE: 7 PH
POC PROTEIN, URINE: NEGATIVE MG/DL
POC SPECIFIC GRAVITY, URINE: 1.02
POC UROBILINOGEN, URINE: 0.2 EU/DL

## 2024-09-26 PROCEDURE — 0501F PRENATAL FLOW SHEET: CPT | Performed by: OBSTETRICS & GYNECOLOGY

## 2024-09-26 PROCEDURE — 90471 IMMUNIZATION ADMIN: CPT | Performed by: OBSTETRICS & GYNECOLOGY

## 2024-09-26 PROCEDURE — 81003 URINALYSIS AUTO W/O SCOPE: CPT | Mod: QW | Performed by: OBSTETRICS & GYNECOLOGY

## 2024-09-26 ASSESSMENT — LIFESTYLE VARIABLES
SKIP TO QUESTIONS 9-10: 1
AUDIT-C TOTAL SCORE: 1
HOW MANY STANDARD DRINKS CONTAINING ALCOHOL DO YOU HAVE ON A TYPICAL DAY: 1 OR 2
HOW OFTEN DO YOU HAVE SIX OR MORE DRINKS ON ONE OCCASION: NEVER
HOW OFTEN DO YOU HAVE A DRINK CONTAINING ALCOHOL: MONTHLY OR LESS

## 2024-09-26 ASSESSMENT — PAIN SCALES - GENERAL: PAINLEVEL_OUTOF10: 0 - NO PAIN

## 2024-09-26 ASSESSMENT — PAIN - FUNCTIONAL ASSESSMENT: PAIN_FUNCTIONAL_ASSESSMENT: 0-10

## 2024-09-26 ASSESSMENT — ENCOUNTER SYMPTOMS
DEPRESSION: 0
LOSS OF SENSATION IN FEET: 0
OCCASIONAL FEELINGS OF UNSTEADINESS: 0

## 2024-10-09 NOTE — PROGRESS NOTES
Subjective   Patient ID [id number]   Abigail Mann is a 39 y.o.  at 35w5d with a working estimated date of delivery of 2024, by Ultrasound who presents for a routine prenatal visit. She denies vaginal bleeding, leakage of fluid, decreased fetal movements, or contractions.    Her pregnancy is complicated by:  AMA  Prior  x 2-plans RLTCS/BPS   @0730  Abnormal 1 hour-normal 3 hour  Anemia-on iron    98%ile, AC>99  -tdap  -RSV     Objective   Physical Exam:   Weight: 73.5 kg (162 lb)    Pregravid BMI: 20.08    BP: 122/74  Fetal Heart Rate: 130 Fundal Height (cm): 36 cm Presentation: Vertex           Prenatal Labs  Urine Dip  Results for orders placed or performed in visit on 10/10/24   POCT UA Automated manually resulted   Result Value Ref Range    POC Color, Urine Yellow Straw, Yellow, Light-Yellow    POC Appearance, Urine Clear Clear    POC Glucose, Urine NEGATIVE NEGATIVE mg/dl    POC Bilirubin, Urine NEGATIVE NEGATIVE    POC Ketones, Urine NEGATIVE NEGATIVE mg/dl    POC Specific Gravity, Urine 1.015 1.005 - 1.035    POC Blood, Urine TRACE-Intact (A) NEGATIVE    POC PH, Urine 6.0 No Reference Range Established PH    POC Protein, Urine NEGATIVE NEGATIVE, 30 (1+) mg/dl    POC Urobilinogen, Urine 0.2 0.2, 1.0 EU/DL    Poc Nitrite, Urine NEGATIVE NEGATIVE    POC Leukocytes, Urine LARGE (3+) (A) NEGATIVE       Assessment/Plan   Diagnoses and all orders for this visit:  Antepartum multigravida of advanced maternal age ([facility])  Previous  delivery affecting pregnancy ([facility])  Anemia affecting pregnancy, antepartum ([facility])  35 weeks gestation of pregnancy ([facility])  -     POCT UA Automated manually resulted  Other orders  -     Flu vaccine, trivalent, preservative free, age 6 months and greater (Fluraix/Fluzone/Flulaval)    Continue prenatal vitamin.  Labs reviewed.  Labor precautions given.  Movement counts  Follow up in 1 week for a routine prenatal visit.

## 2024-10-10 ENCOUNTER — ROUTINE PRENATAL (OUTPATIENT)
Dept: OBSTETRICS AND GYNECOLOGY | Facility: CLINIC | Age: 39
End: 2024-10-10
Payer: COMMERCIAL

## 2024-10-10 VITALS — SYSTOLIC BLOOD PRESSURE: 122 MMHG | DIASTOLIC BLOOD PRESSURE: 74 MMHG | BODY MASS INDEX: 24.63 KG/M2 | WEIGHT: 162 LBS

## 2024-10-10 DIAGNOSIS — O34.219 PREVIOUS CESAREAN DELIVERY AFFECTING PREGNANCY (HHS-HCC): ICD-10-CM

## 2024-10-10 DIAGNOSIS — O09.529 ANTEPARTUM MULTIGRAVIDA OF ADVANCED MATERNAL AGE (HHS-HCC): Primary | ICD-10-CM

## 2024-10-10 DIAGNOSIS — Z3A.35 35 WEEKS GESTATION OF PREGNANCY (HHS-HCC): ICD-10-CM

## 2024-10-10 DIAGNOSIS — O99.019 ANEMIA AFFECTING PREGNANCY, ANTEPARTUM (HHS-HCC): ICD-10-CM

## 2024-10-10 LAB
POC APPEARANCE, URINE: CLEAR
POC BILIRUBIN, URINE: NEGATIVE
POC BLOOD, URINE: ABNORMAL
POC COLOR, URINE: YELLOW
POC GLUCOSE, URINE: NEGATIVE MG/DL
POC KETONES, URINE: NEGATIVE MG/DL
POC LEUKOCYTES, URINE: ABNORMAL
POC NITRITE,URINE: NEGATIVE
POC PH, URINE: 6 PH
POC PROTEIN, URINE: NEGATIVE MG/DL
POC SPECIFIC GRAVITY, URINE: 1.01
POC UROBILINOGEN, URINE: 0.2 EU/DL

## 2024-10-10 PROCEDURE — 90656 IIV3 VACC NO PRSV 0.5 ML IM: CPT | Performed by: OBSTETRICS & GYNECOLOGY

## 2024-10-10 PROCEDURE — 81003 URINALYSIS AUTO W/O SCOPE: CPT | Performed by: OBSTETRICS & GYNECOLOGY

## 2024-10-10 PROCEDURE — 0501F PRENATAL FLOW SHEET: CPT | Performed by: OBSTETRICS & GYNECOLOGY

## 2024-10-10 ASSESSMENT — ENCOUNTER SYMPTOMS
DEPRESSION: 0
OCCASIONAL FEELINGS OF UNSTEADINESS: 0
LOSS OF SENSATION IN FEET: 0

## 2024-10-10 ASSESSMENT — LIFESTYLE VARIABLES
HOW MANY STANDARD DRINKS CONTAINING ALCOHOL DO YOU HAVE ON A TYPICAL DAY: PATIENT DOES NOT DRINK
SKIP TO QUESTIONS 9-10: 1
HOW OFTEN DO YOU HAVE SIX OR MORE DRINKS ON ONE OCCASION: NEVER
HOW OFTEN DO YOU HAVE A DRINK CONTAINING ALCOHOL: NEVER
AUDIT-C TOTAL SCORE: 0

## 2024-10-10 ASSESSMENT — PAIN SCALES - GENERAL: PAINLEVEL_OUTOF10: 0 - NO PAIN

## 2024-10-10 ASSESSMENT — PAIN - FUNCTIONAL ASSESSMENT: PAIN_FUNCTIONAL_ASSESSMENT: 0-10

## 2024-10-16 NOTE — PROGRESS NOTES
Subjective   Patient ID [id number]   Abigail Mann is a 39 y.o.  at 36w5d with a working estimated date of delivery of 2024, by Ultrasound who presents for a routine prenatal visit. She denies vaginal bleeding, leakage of fluid, decreased fetal movements, or contractions.    Objective   Physical Exam:   Weight: 74.7 kg (164 lb 9.6 oz)    Pregravid BMI: 20.08    BP: 122/76  Fetal Heart Rate: cat 1   Presentation: Vertex  Dilation: Closed Effacement (%): 70 Fetal Station: 0    Prenatal Labs  Urine Dip  Results for orders placed or performed in visit on 10/17/24   POCT UA Automated manually resulted    Collection Time: 10/17/24  8:02 AM   Result Value Ref Range    POC Color, Urine Yellow Straw, Yellow, Light-Yellow    POC Appearance, Urine Clear Clear    POC Glucose, Urine NEGATIVE NEGATIVE mg/dl    POC Bilirubin, Urine NEGATIVE NEGATIVE    POC Ketones, Urine NEGATIVE NEGATIVE mg/dl    POC Specific Gravity, Urine 1.025 1.005 - 1.035    POC Blood, Urine NEGATIVE NEGATIVE    POC PH, Urine 7.0 No Reference Range Established PH    POC Protein, Urine NEGATIVE NEGATIVE, 30 (1+) mg/dl    POC Urobilinogen, Urine 0.2 0.2, 1.0 EU/DL    Poc Nitrite, Urine NEGATIVE NEGATIVE    POC Leukocytes, Urine SMALL (1+) (A) NEGATIVE         Assessment/Plan   Diagnoses and all orders for this visit:  Antepartum multigravida of advanced maternal age ([facility])  Previous  delivery affecting pregnancy ([facility])  36 weeks gestation of pregnancy ([facility])  -     POCT UA Automated manually resulted    Continue prenatal vitamin.  Labs reviewed.  Labor precautions given.  Movement counts  Follow up in 1 week for a routine prenatal visit.

## 2024-10-17 ENCOUNTER — ROUTINE PRENATAL (OUTPATIENT)
Dept: OBSTETRICS AND GYNECOLOGY | Facility: CLINIC | Age: 39
End: 2024-10-17
Payer: COMMERCIAL

## 2024-10-17 VITALS — SYSTOLIC BLOOD PRESSURE: 122 MMHG | WEIGHT: 164.6 LBS | DIASTOLIC BLOOD PRESSURE: 76 MMHG | BODY MASS INDEX: 25.03 KG/M2

## 2024-10-17 DIAGNOSIS — O34.219 PREVIOUS CESAREAN DELIVERY AFFECTING PREGNANCY (HHS-HCC): ICD-10-CM

## 2024-10-17 DIAGNOSIS — O09.529 ANTEPARTUM MULTIGRAVIDA OF ADVANCED MATERNAL AGE (HHS-HCC): Primary | ICD-10-CM

## 2024-10-17 DIAGNOSIS — Z3A.36 36 WEEKS GESTATION OF PREGNANCY (HHS-HCC): ICD-10-CM

## 2024-10-17 PROCEDURE — 0501F PRENATAL FLOW SHEET: CPT | Performed by: OBSTETRICS & GYNECOLOGY

## 2024-10-17 PROCEDURE — 59025 FETAL NON-STRESS TEST: CPT | Performed by: OBSTETRICS & GYNECOLOGY

## 2024-10-17 PROCEDURE — 81003 URINALYSIS AUTO W/O SCOPE: CPT | Mod: QW | Performed by: OBSTETRICS & GYNECOLOGY

## 2024-10-17 PROCEDURE — 87081 CULTURE SCREEN ONLY: CPT | Mod: WESLAB | Performed by: OBSTETRICS & GYNECOLOGY

## 2024-10-17 ASSESSMENT — ENCOUNTER SYMPTOMS
LOSS OF SENSATION IN FEET: 0
OCCASIONAL FEELINGS OF UNSTEADINESS: 0
DEPRESSION: 0

## 2024-10-17 ASSESSMENT — PAIN - FUNCTIONAL ASSESSMENT: PAIN_FUNCTIONAL_ASSESSMENT: 0-10

## 2024-10-17 ASSESSMENT — LIFESTYLE VARIABLES
HOW OFTEN DO YOU HAVE SIX OR MORE DRINKS ON ONE OCCASION: NEVER
HOW MANY STANDARD DRINKS CONTAINING ALCOHOL DO YOU HAVE ON A TYPICAL DAY: PATIENT DOES NOT DRINK
SKIP TO QUESTIONS 9-10: 1
AUDIT-C TOTAL SCORE: 0
HOW OFTEN DO YOU HAVE A DRINK CONTAINING ALCOHOL: NEVER

## 2024-10-17 NOTE — PROCEDURES
Abigail Mann, a  at 36w5d with an ROSALBA of 2024, by Ultrasound, was seen at [facility] for a nonstress test.    Non-Stress Test   Variability in Waveform for Non-Stress Test: Moderate  Accelerations in Non-Stress Test: Yes  Decelerations in Non-Stress Test: None  Contractions in Non-Stress Test: Not present  Interpretation of Non-Stress Test   Interpretation of Non-Stress Test: Reactive

## 2024-10-20 LAB — GP B STREP GENITAL QL CULT: NORMAL

## 2024-10-21 NOTE — PROGRESS NOTES
Subjective   Patient ID [id number]   Abigail Mann is a 39 y.o.  at 37w3d with a working estimated date of delivery of 2024, by Ultrasound who presents for a routine prenatal visit. She denies vaginal bleeding, leakage of fluid, decreased fetal movements, or contractions.    Objective   Physical Exam:   Weight: 73.9 kg (163 lb)    Pregravid BMI: 20.08    BP: 128/62  Fetal Heart Rate: Cat 1   Presentation: Vertex  Dilation:  (deferred)        Prenatal Labs  Urine Dip  Results for orders placed or performed in visit on 10/22/24   POCT UA Automated manually resulted    Collection Time: 10/22/24  1:11 PM   Result Value Ref Range    POC Color, Urine Yellow Straw, Yellow, Light-Yellow    POC Appearance, Urine Clear Clear    POC Glucose, Urine NEGATIVE NEGATIVE mg/dl    POC Bilirubin, Urine NEGATIVE NEGATIVE    POC Ketones, Urine NEGATIVE NEGATIVE mg/dl    POC Specific Gravity, Urine 1.015 1.005 - 1.035    POC Blood, Urine NEGATIVE NEGATIVE    POC PH, Urine 7.0 No Reference Range Established PH    POC Protein, Urine NEGATIVE NEGATIVE, 30 (1+) mg/dl    POC Urobilinogen, Urine 0.2 0.2, 1.0 EU/DL    Poc Nitrite, Urine NEGATIVE NEGATIVE    POC Leukocytes, Urine MODERATE (2+) (A) NEGATIVE         Assessment/Plan   Diagnoses and all orders for this visit:  Antepartum multigravida of advanced maternal age ([facility])  -     POCT UA Automated manually resulted  -     Fetal nonstress test  Previous  delivery affecting pregnancy ([facility])  37 weeks gestation of pregnancy ([facility])    Continue prenatal vitamin.  Labs reviewed.  Labor precautions given.  Movement counts  Follow up in 1 week for a routine prenatal visit.

## 2024-10-22 ENCOUNTER — ROUTINE PRENATAL (OUTPATIENT)
Dept: OBSTETRICS AND GYNECOLOGY | Facility: CLINIC | Age: 39
End: 2024-10-22
Payer: COMMERCIAL

## 2024-10-22 VITALS — BODY MASS INDEX: 24.78 KG/M2 | WEIGHT: 163 LBS | DIASTOLIC BLOOD PRESSURE: 62 MMHG | SYSTOLIC BLOOD PRESSURE: 128 MMHG

## 2024-10-22 DIAGNOSIS — O09.529 ANTEPARTUM MULTIGRAVIDA OF ADVANCED MATERNAL AGE (HHS-HCC): Primary | ICD-10-CM

## 2024-10-22 DIAGNOSIS — Z3A.37 37 WEEKS GESTATION OF PREGNANCY (HHS-HCC): ICD-10-CM

## 2024-10-22 DIAGNOSIS — O34.219 PREVIOUS CESAREAN DELIVERY AFFECTING PREGNANCY (HHS-HCC): ICD-10-CM

## 2024-10-22 PROCEDURE — 0501F PRENATAL FLOW SHEET: CPT | Performed by: OBSTETRICS & GYNECOLOGY

## 2024-10-22 PROCEDURE — 59025 FETAL NON-STRESS TEST: CPT | Performed by: OBSTETRICS & GYNECOLOGY

## 2024-10-22 PROCEDURE — 81003 URINALYSIS AUTO W/O SCOPE: CPT | Performed by: OBSTETRICS & GYNECOLOGY

## 2024-10-22 ASSESSMENT — LIFESTYLE VARIABLES
HOW OFTEN DO YOU HAVE A DRINK CONTAINING ALCOHOL: NEVER
SKIP TO QUESTIONS 9-10: 1
HOW OFTEN DO YOU HAVE SIX OR MORE DRINKS ON ONE OCCASION: NEVER
AUDIT-C TOTAL SCORE: 0
HOW MANY STANDARD DRINKS CONTAINING ALCOHOL DO YOU HAVE ON A TYPICAL DAY: PATIENT DOES NOT DRINK

## 2024-10-22 ASSESSMENT — ENCOUNTER SYMPTOMS
OCCASIONAL FEELINGS OF UNSTEADINESS: 0
LOSS OF SENSATION IN FEET: 0
DEPRESSION: 0

## 2024-10-22 ASSESSMENT — PAIN SCALES - GENERAL: PAINLEVEL_OUTOF10: 0 - NO PAIN

## 2024-10-22 ASSESSMENT — PAIN - FUNCTIONAL ASSESSMENT: PAIN_FUNCTIONAL_ASSESSMENT: 0-10

## 2024-10-22 NOTE — PROCEDURES
Abigail Mann, a  at 37w3d with an ROSALBA of 2024, by Ultrasound, was seen at [facility] for a nonstress test.    Non-Stress Test   Variability in Waveform for Non-Stress Test: Moderate  Accelerations in Non-Stress Test: Yes  Decelerations in Non-Stress Test: None  Contractions in Non-Stress Test: Not present  Interpretation of Non-Stress Test   Interpretation of Non-Stress Test: Reactive

## 2024-10-23 ENCOUNTER — HOSPITAL ENCOUNTER (OUTPATIENT)
Dept: RADIOLOGY | Facility: CLINIC | Age: 39
Discharge: HOME | End: 2024-10-23
Payer: COMMERCIAL

## 2024-10-23 DIAGNOSIS — O09.529 ANTEPARTUM MULTIGRAVIDA OF ADVANCED MATERNAL AGE (HHS-HCC): ICD-10-CM

## 2024-10-23 PROCEDURE — 76816 OB US FOLLOW-UP PER FETUS: CPT

## 2024-10-23 PROCEDURE — 76816 OB US FOLLOW-UP PER FETUS: CPT | Performed by: OBSTETRICS & GYNECOLOGY

## 2024-10-30 ENCOUNTER — ROUTINE PRENATAL (OUTPATIENT)
Dept: OBSTETRICS AND GYNECOLOGY | Facility: CLINIC | Age: 39
End: 2024-10-30
Payer: COMMERCIAL

## 2024-10-30 VITALS — WEIGHT: 164 LBS | SYSTOLIC BLOOD PRESSURE: 134 MMHG | DIASTOLIC BLOOD PRESSURE: 82 MMHG | BODY MASS INDEX: 24.94 KG/M2

## 2024-10-30 DIAGNOSIS — Z3A.38 38 WEEKS GESTATION OF PREGNANCY (HHS-HCC): ICD-10-CM

## 2024-10-30 DIAGNOSIS — O09.529 ANTEPARTUM MULTIGRAVIDA OF ADVANCED MATERNAL AGE (HHS-HCC): Primary | ICD-10-CM

## 2024-10-30 DIAGNOSIS — O34.219 PREVIOUS CESAREAN DELIVERY AFFECTING PREGNANCY (HHS-HCC): ICD-10-CM

## 2024-10-30 PROCEDURE — 59025 FETAL NON-STRESS TEST: CPT | Performed by: OBSTETRICS & GYNECOLOGY

## 2024-10-30 PROCEDURE — 0501F PRENATAL FLOW SHEET: CPT | Performed by: OBSTETRICS & GYNECOLOGY

## 2024-10-30 PROCEDURE — 81003 URINALYSIS AUTO W/O SCOPE: CPT | Performed by: OBSTETRICS & GYNECOLOGY

## 2024-10-30 ASSESSMENT — LIFESTYLE VARIABLES
HOW OFTEN DO YOU HAVE SIX OR MORE DRINKS ON ONE OCCASION: NEVER
AUDIT-C TOTAL SCORE: 0
HOW MANY STANDARD DRINKS CONTAINING ALCOHOL DO YOU HAVE ON A TYPICAL DAY: PATIENT DOES NOT DRINK
SKIP TO QUESTIONS 9-10: 1
HOW OFTEN DO YOU HAVE A DRINK CONTAINING ALCOHOL: NEVER

## 2024-10-30 ASSESSMENT — ENCOUNTER SYMPTOMS
OCCASIONAL FEELINGS OF UNSTEADINESS: 0
DEPRESSION: 0
LOSS OF SENSATION IN FEET: 0

## 2024-10-30 ASSESSMENT — PAIN - FUNCTIONAL ASSESSMENT: PAIN_FUNCTIONAL_ASSESSMENT: 0-10

## 2024-11-01 ENCOUNTER — ROUTINE PRENATAL (OUTPATIENT)
Dept: OBSTETRICS AND GYNECOLOGY | Facility: CLINIC | Age: 39
End: 2024-11-01
Payer: COMMERCIAL

## 2024-11-01 ENCOUNTER — TELEPHONE (OUTPATIENT)
Dept: OBSTETRICS AND GYNECOLOGY | Facility: CLINIC | Age: 39
End: 2024-11-01

## 2024-11-01 VITALS — SYSTOLIC BLOOD PRESSURE: 124 MMHG | WEIGHT: 166.6 LBS | BODY MASS INDEX: 25.33 KG/M2 | DIASTOLIC BLOOD PRESSURE: 82 MMHG

## 2024-11-01 DIAGNOSIS — O36.8131 DECREASED FETAL MOVEMENTS IN THIRD TRIMESTER, FETUS 1 OF MULTIPLE GESTATION (HHS-HCC): Primary | ICD-10-CM

## 2024-11-01 PROCEDURE — 59025 FETAL NON-STRESS TEST: CPT | Performed by: STUDENT IN AN ORGANIZED HEALTH CARE EDUCATION/TRAINING PROGRAM

## 2024-11-01 PROCEDURE — 0501F PRENATAL FLOW SHEET: CPT | Performed by: STUDENT IN AN ORGANIZED HEALTH CARE EDUCATION/TRAINING PROGRAM

## 2024-11-01 SDOH — ECONOMIC STABILITY: TRANSPORTATION INSECURITY
IN THE PAST 12 MONTHS, HAS LACK OF TRANSPORTATION KEPT YOU FROM MEETINGS, WORK, OR FROM GETTING THINGS NEEDED FOR DAILY LIVING?: NO

## 2024-11-01 SDOH — ECONOMIC STABILITY: FOOD INSECURITY: WITHIN THE PAST 12 MONTHS, THE FOOD YOU BOUGHT JUST DIDN'T LAST AND YOU DIDN'T HAVE MONEY TO GET MORE.: NEVER TRUE

## 2024-11-01 SDOH — ECONOMIC STABILITY: FOOD INSECURITY: WITHIN THE PAST 12 MONTHS, YOU WORRIED THAT YOUR FOOD WOULD RUN OUT BEFORE YOU GOT MONEY TO BUY MORE.: NEVER TRUE

## 2024-11-01 SDOH — ECONOMIC STABILITY: TRANSPORTATION INSECURITY
IN THE PAST 12 MONTHS, HAS THE LACK OF TRANSPORTATION KEPT YOU FROM MEDICAL APPOINTMENTS OR FROM GETTING MEDICATIONS?: NO

## 2024-11-01 ASSESSMENT — EDINBURGH POSTNATAL DEPRESSION SCALE (EPDS)
I HAVE BLAMED MYSELF UNNECESSARILY WHEN THINGS WENT WRONG: NO, NEVER
I HAVE BEEN SO UNHAPPY THAT I HAVE HAD DIFFICULTY SLEEPING: NOT AT ALL
TOTAL SCORE: 0
I HAVE LOOKED FORWARD WITH ENJOYMENT TO THINGS: AS MUCH AS I EVER DID
I HAVE FELT SCARED OR PANICKY FOR NO GOOD REASON: NO, NOT AT ALL
I HAVE BEEN ABLE TO LAUGH AND SEE THE FUNNY SIDE OF THINGS: AS MUCH AS I ALWAYS COULD
THE THOUGHT OF HARMING MYSELF HAS OCCURRED TO ME: NEVER
I HAVE BEEN ANXIOUS OR WORRIED FOR NO GOOD REASON: NO, NOT AT ALL
I HAVE FELT SAD OR MISERABLE: NO, NOT AT ALL
THINGS HAVE BEEN GETTING ON TOP OF ME: NO, I HAVE BEEN COPING AS WELL AS EVER
I HAVE BEEN SO UNHAPPY THAT I HAVE BEEN CRYING: NO, NEVER

## 2024-11-01 ASSESSMENT — SOCIAL DETERMINANTS OF HEALTH (SDOH)
IN A TYPICAL WEEK, HOW MANY TIMES DO YOU TALK ON THE PHONE WITH FAMILY, FRIENDS, OR NEIGHBORS?: PATIENT DECLINED
HOW OFTEN DO YOU ATTEND CHURCH OR RELIGIOUS SERVICES?: PATIENT DECLINED
WITHIN THE LAST YEAR, HAVE TO BEEN RAPED OR FORCED TO HAVE ANY KIND OF SEXUAL ACTIVITY BY YOUR PARTNER OR EX-PARTNER?: NO
HOW OFTEN DO YOU ATTENT MEETINGS OF THE CLUB OR ORGANIZATION YOU BELONG TO?: PATIENT DECLINED
WITHIN THE LAST YEAR, HAVE YOU BEEN HUMILIATED OR EMOTIONALLY ABUSED IN OTHER WAYS BY YOUR PARTNER OR EX-PARTNER?: NO
WITHIN THE LAST YEAR, HAVE YOU BEEN KICKED, HIT, SLAPPED, OR OTHERWISE PHYSICALLY HURT BY YOUR PARTNER OR EX-PARTNER?: NO
DO YOU BELONG TO ANY CLUBS OR ORGANIZATIONS SUCH AS CHURCH GROUPS UNIONS, FRATERNAL OR ATHLETIC GROUPS, OR SCHOOL GROUPS?: PATIENT DECLINED
HOW HARD IS IT FOR YOU TO PAY FOR THE VERY BASICS LIKE FOOD, HOUSING, MEDICAL CARE, AND HEATING?: NOT HARD AT ALL
WITHIN THE LAST YEAR, HAVE YOU BEEN AFRAID OF YOUR PARTNER OR EX-PARTNER?: NO
HOW OFTEN DO YOU GET TOGETHER WITH FRIENDS OR RELATIVES?: PATIENT DECLINED

## 2024-11-01 ASSESSMENT — ENCOUNTER SYMPTOMS
DEPRESSION: 0
OCCASIONAL FEELINGS OF UNSTEADINESS: 0
LOSS OF SENSATION IN FEET: 0

## 2024-11-01 ASSESSMENT — LIFESTYLE VARIABLES: HOW OFTEN DO YOU HAVE A DRINK CONTAINING ALCOHOL: NEVER

## 2024-11-01 NOTE — TELEPHONE ENCOUNTER
"38+6  Pt c/o \"decreased fetal movement\"  Pt states she doesn't really think she felt the baby move at all yesterday and might have felt something small last night but is not sure.  Pt states has not felt anything today.  Appt scheduled today for NST  "

## 2024-11-05 ENCOUNTER — ANESTHESIA (OUTPATIENT)
Dept: OBSTETRICS AND GYNECOLOGY | Facility: HOSPITAL | Age: 39
End: 2024-11-05
Payer: COMMERCIAL

## 2024-11-05 ENCOUNTER — ANESTHESIA EVENT (OUTPATIENT)
Dept: OBSTETRICS AND GYNECOLOGY | Facility: HOSPITAL | Age: 39
End: 2024-11-05
Payer: COMMERCIAL

## 2024-11-05 ENCOUNTER — HOSPITAL ENCOUNTER (INPATIENT)
Facility: HOSPITAL | Age: 39
LOS: 2 days | Discharge: HOME | End: 2024-11-07
Attending: OBSTETRICS & GYNECOLOGY | Admitting: OBSTETRICS & GYNECOLOGY
Payer: COMMERCIAL

## 2024-11-05 DIAGNOSIS — O34.219 PREVIOUS CESAREAN SECTION COMPLICATING PREGNANCY (HHS-HCC): ICD-10-CM

## 2024-11-05 DIAGNOSIS — Z30.2 STERILIZATION: ICD-10-CM

## 2024-11-05 DIAGNOSIS — Z30.2 ENCOUNTER FOR STERILIZATION: ICD-10-CM

## 2024-11-05 DIAGNOSIS — G89.18 POSTOPERATIVE PAIN: Primary | ICD-10-CM

## 2024-11-05 LAB
ABO GROUP (TYPE) IN BLOOD: NORMAL
ABO GROUP (TYPE) IN BLOOD: NORMAL
ANTIBODY SCREEN: NORMAL
BASOPHILS # BLD AUTO: 0.02 X10*3/UL (ref 0–0.1)
BASOPHILS NFR BLD AUTO: 0.3 %
EOSINOPHIL # BLD AUTO: 0.05 X10*3/UL (ref 0–0.7)
EOSINOPHIL NFR BLD AUTO: 0.7 %
ERYTHROCYTE [DISTWIDTH] IN BLOOD BY AUTOMATED COUNT: 12.7 % (ref 11.5–14.5)
HCT VFR BLD AUTO: 35.7 % (ref 36–46)
HGB BLD-MCNC: 11.9 G/DL (ref 12–16)
IMM GRANULOCYTES # BLD AUTO: 0.06 X10*3/UL (ref 0–0.7)
IMM GRANULOCYTES NFR BLD AUTO: 0.8 % (ref 0–0.9)
LYMPHOCYTES # BLD AUTO: 2.2 X10*3/UL (ref 1.2–4.8)
LYMPHOCYTES NFR BLD AUTO: 31.1 %
MCH RBC QN AUTO: 30.4 PG (ref 26–34)
MCHC RBC AUTO-ENTMCNC: 33.3 G/DL (ref 32–36)
MCV RBC AUTO: 91 FL (ref 80–100)
MONOCYTES # BLD AUTO: 0.59 X10*3/UL (ref 0.1–1)
MONOCYTES NFR BLD AUTO: 8.3 %
NEUTROPHILS # BLD AUTO: 4.15 X10*3/UL (ref 1.2–7.7)
NEUTROPHILS NFR BLD AUTO: 58.8 %
NRBC BLD-RTO: 0 /100 WBCS (ref 0–0)
PLATELET # BLD AUTO: 175 X10*3/UL (ref 150–450)
RBC # BLD AUTO: 3.91 X10*6/UL (ref 4–5.2)
RH FACTOR (ANTIGEN D): NORMAL
RH FACTOR (ANTIGEN D): NORMAL
WBC # BLD AUTO: 7.1 X10*3/UL (ref 4.4–11.3)

## 2024-11-05 PROCEDURE — 2500000004 HC RX 250 GENERAL PHARMACY W/ HCPCS (ALT 636 FOR OP/ED): Performed by: NURSE ANESTHETIST, CERTIFIED REGISTERED

## 2024-11-05 PROCEDURE — 88302 TISSUE EXAM BY PATHOLOGIST: CPT | Mod: TC | Performed by: OBSTETRICS & GYNECOLOGY

## 2024-11-05 PROCEDURE — 1220000001 HC OB SEMI-PRIVATE ROOM DAILY

## 2024-11-05 PROCEDURE — 2500000001 HC RX 250 WO HCPCS SELF ADMINISTERED DRUGS (ALT 637 FOR MEDICARE OP): Performed by: OBSTETRICS & GYNECOLOGY

## 2024-11-05 PROCEDURE — 2720000007 HC OR 272 NO HCPCS: Performed by: OBSTETRICS & GYNECOLOGY

## 2024-11-05 PROCEDURE — 85025 COMPLETE CBC W/AUTO DIFF WBC: CPT | Performed by: OBSTETRICS & GYNECOLOGY

## 2024-11-05 PROCEDURE — 7200000001 HC TUBAL LIGATION: Performed by: OBSTETRICS & GYNECOLOGY

## 2024-11-05 PROCEDURE — A4649 SURGICAL SUPPLIES: HCPCS | Performed by: OBSTETRICS & GYNECOLOGY

## 2024-11-05 PROCEDURE — 59514 CESAREAN DELIVERY ONLY: CPT | Performed by: OBSTETRICS & GYNECOLOGY

## 2024-11-05 PROCEDURE — 7100000016 HC LABOR RECOVERY PER HOUR: Performed by: OBSTETRICS & GYNECOLOGY

## 2024-11-05 PROCEDURE — 36415 COLL VENOUS BLD VENIPUNCTURE: CPT | Performed by: OBSTETRICS & GYNECOLOGY

## 2024-11-05 PROCEDURE — 86901 BLOOD TYPING SEROLOGIC RH(D): CPT | Performed by: OBSTETRICS & GYNECOLOGY

## 2024-11-05 PROCEDURE — 0UB70ZZ EXCISION OF BILATERAL FALLOPIAN TUBES, OPEN APPROACH: ICD-10-PCS | Performed by: OBSTETRICS & GYNECOLOGY

## 2024-11-05 PROCEDURE — 01961 ANES CESAREAN DELIVERY ONLY: CPT | Performed by: NURSE ANESTHETIST, CERTIFIED REGISTERED

## 2024-11-05 PROCEDURE — 59510 CESAREAN DELIVERY: CPT | Performed by: OBSTETRICS & GYNECOLOGY

## 2024-11-05 PROCEDURE — 58611 LIGATE OVIDUCT(S) ADD-ON: CPT | Performed by: OBSTETRICS & GYNECOLOGY

## 2024-11-05 PROCEDURE — 2500000002 HC RX 250 W HCPCS SELF ADMINISTERED DRUGS (ALT 637 FOR MEDICARE OP, ALT 636 FOR OP/ED): Performed by: OBSTETRICS & GYNECOLOGY

## 2024-11-05 PROCEDURE — 3700000014 HC AN EPIDURAL BLOCK CHARGE: Performed by: OBSTETRICS & GYNECOLOGY

## 2024-11-05 PROCEDURE — 2500000004 HC RX 250 GENERAL PHARMACY W/ HCPCS (ALT 636 FOR OP/ED): Performed by: OBSTETRICS & GYNECOLOGY

## 2024-11-05 PROCEDURE — 2500000004 HC RX 250 GENERAL PHARMACY W/ HCPCS (ALT 636 FOR OP/ED): Mod: JZ | Performed by: OBSTETRICS & GYNECOLOGY

## 2024-11-05 RX ORDER — OXYTOCIN 10 [USP'U]/ML
10 INJECTION, SOLUTION INTRAMUSCULAR; INTRAVENOUS ONCE AS NEEDED
Status: DISCONTINUED | OUTPATIENT
Start: 2024-11-05 | End: 2024-11-07 | Stop reason: HOSPADM

## 2024-11-05 RX ORDER — ADHESIVE BANDAGE
10 BANDAGE TOPICAL
Status: DISCONTINUED | OUTPATIENT
Start: 2024-11-05 | End: 2024-11-07 | Stop reason: HOSPADM

## 2024-11-05 RX ORDER — ONDANSETRON HYDROCHLORIDE 2 MG/ML
4 INJECTION, SOLUTION INTRAVENOUS EVERY 6 HOURS PRN
Status: DISCONTINUED | OUTPATIENT
Start: 2024-11-05 | End: 2024-11-07 | Stop reason: HOSPADM

## 2024-11-05 RX ORDER — ACETAMINOPHEN 325 MG/1
975 TABLET ORAL EVERY 6 HOURS SCHEDULED
Status: DISCONTINUED | OUTPATIENT
Start: 2024-11-05 | End: 2024-11-07 | Stop reason: HOSPADM

## 2024-11-05 RX ORDER — HYDROMORPHONE HYDROCHLORIDE 1 MG/ML
0.2 INJECTION, SOLUTION INTRAMUSCULAR; INTRAVENOUS; SUBCUTANEOUS EVERY 5 MIN PRN
Status: DISCONTINUED | OUTPATIENT
Start: 2024-11-05 | End: 2024-11-07 | Stop reason: HOSPADM

## 2024-11-05 RX ORDER — BUPIVACAINE HYDROCHLORIDE 7.5 MG/ML
INJECTION, SOLUTION INTRASPINAL AS NEEDED
Status: DISCONTINUED | OUTPATIENT
Start: 2024-11-05 | End: 2024-11-05

## 2024-11-05 RX ORDER — DIPHENHYDRAMINE HCL 25 MG
25 TABLET ORAL EVERY 4 HOURS PRN
Status: DISCONTINUED | OUTPATIENT
Start: 2024-11-05 | End: 2024-11-07 | Stop reason: HOSPADM

## 2024-11-05 RX ORDER — LOPERAMIDE HYDROCHLORIDE 2 MG/1
4 CAPSULE ORAL EVERY 2 HOUR PRN
Status: DISCONTINUED | OUTPATIENT
Start: 2024-11-05 | End: 2024-11-07 | Stop reason: HOSPADM

## 2024-11-05 RX ORDER — OXYTOCIN/0.9 % SODIUM CHLORIDE 30/500 ML
60 PLASTIC BAG, INJECTION (ML) INTRAVENOUS ONCE AS NEEDED
Status: COMPLETED | OUTPATIENT
Start: 2024-11-05 | End: 2024-11-05

## 2024-11-05 RX ORDER — TRANEXAMIC ACID 10 MG/ML
1000 INJECTION, SOLUTION INTRAVENOUS ONCE AS NEEDED
Status: DISCONTINUED | OUTPATIENT
Start: 2024-11-05 | End: 2024-11-07 | Stop reason: HOSPADM

## 2024-11-05 RX ORDER — ONDANSETRON HYDROCHLORIDE 2 MG/ML
INJECTION, SOLUTION INTRAVENOUS AS NEEDED
Status: DISCONTINUED | OUTPATIENT
Start: 2024-11-05 | End: 2024-11-05

## 2024-11-05 RX ORDER — LABETALOL HYDROCHLORIDE 5 MG/ML
20 INJECTION, SOLUTION INTRAVENOUS ONCE AS NEEDED
Status: DISCONTINUED | OUTPATIENT
Start: 2024-11-05 | End: 2024-11-07 | Stop reason: HOSPADM

## 2024-11-05 RX ORDER — MISOPROSTOL 200 UG/1
800 TABLET ORAL ONCE AS NEEDED
Status: DISCONTINUED | OUTPATIENT
Start: 2024-11-05 | End: 2024-11-07 | Stop reason: HOSPADM

## 2024-11-05 RX ORDER — FENTANYL CITRATE 50 UG/ML
INJECTION, SOLUTION INTRAMUSCULAR; INTRAVENOUS AS NEEDED
Status: DISCONTINUED | OUTPATIENT
Start: 2024-11-05 | End: 2024-11-05

## 2024-11-05 RX ORDER — METHYLERGONOVINE MALEATE 0.2 MG/ML
0.2 INJECTION INTRAVENOUS ONCE AS NEEDED
Status: DISCONTINUED | OUTPATIENT
Start: 2024-11-05 | End: 2024-11-07 | Stop reason: HOSPADM

## 2024-11-05 RX ORDER — OXYTOCIN/0.9 % SODIUM CHLORIDE 30/500 ML
2-30 PLASTIC BAG, INJECTION (ML) INTRAVENOUS CONTINUOUS
Status: DISCONTINUED | OUTPATIENT
Start: 2024-11-05 | End: 2024-11-05

## 2024-11-05 RX ORDER — KETOROLAC TROMETHAMINE 30 MG/ML
30 INJECTION, SOLUTION INTRAMUSCULAR; INTRAVENOUS EVERY 6 HOURS
Status: COMPLETED | OUTPATIENT
Start: 2024-11-05 | End: 2024-11-06

## 2024-11-05 RX ORDER — OXYCODONE HYDROCHLORIDE 5 MG/1
10 TABLET ORAL EVERY 4 HOURS PRN
Status: DISCONTINUED | OUTPATIENT
Start: 2024-11-06 | End: 2024-11-07 | Stop reason: HOSPADM

## 2024-11-05 RX ORDER — CEFAZOLIN SODIUM 2 G/100ML
2 INJECTION, SOLUTION INTRAVENOUS ONCE
Status: COMPLETED | OUTPATIENT
Start: 2024-11-05 | End: 2024-11-05

## 2024-11-05 RX ORDER — PHENYLEPHRINE 10 MG/250 ML(40 MCG/ML)IN 0.9 % SOD.CHLORIDE INTRAVENOUS
CONTINUOUS PRN
Status: DISCONTINUED | OUTPATIENT
Start: 2024-11-05 | End: 2024-11-05

## 2024-11-05 RX ORDER — CARBOPROST TROMETHAMINE 250 UG/ML
250 INJECTION, SOLUTION INTRAMUSCULAR ONCE AS NEEDED
Status: DISCONTINUED | OUTPATIENT
Start: 2024-11-05 | End: 2024-11-07 | Stop reason: HOSPADM

## 2024-11-05 RX ORDER — IBUPROFEN 600 MG/1
600 TABLET ORAL EVERY 6 HOURS
Status: DISCONTINUED | OUTPATIENT
Start: 2024-11-06 | End: 2024-11-07 | Stop reason: HOSPADM

## 2024-11-05 RX ORDER — FERROUS SULFATE 325(65) MG
325 TABLET ORAL EVERY OTHER DAY
COMMUNITY

## 2024-11-05 RX ORDER — DOCUSATE SODIUM 100 MG/1
100 CAPSULE, LIQUID FILLED ORAL DAILY PRN
COMMUNITY

## 2024-11-05 RX ORDER — OXYCODONE HYDROCHLORIDE 5 MG/1
5 TABLET ORAL EVERY 4 HOURS PRN
Status: DISCONTINUED | OUTPATIENT
Start: 2024-11-06 | End: 2024-11-07 | Stop reason: HOSPADM

## 2024-11-05 RX ORDER — ONDANSETRON 4 MG/1
4 TABLET, FILM COATED ORAL EVERY 6 HOURS PRN
Status: DISCONTINUED | OUTPATIENT
Start: 2024-11-05 | End: 2024-11-07 | Stop reason: HOSPADM

## 2024-11-05 RX ORDER — NIFEDIPINE 10 MG/1
10 CAPSULE ORAL ONCE AS NEEDED
Status: DISCONTINUED | OUTPATIENT
Start: 2024-11-05 | End: 2024-11-07 | Stop reason: HOSPADM

## 2024-11-05 RX ORDER — LIDOCAINE 560 MG/1
1 PATCH PERCUTANEOUS; TOPICAL; TRANSDERMAL
Status: DISCONTINUED | OUTPATIENT
Start: 2024-11-05 | End: 2024-11-07 | Stop reason: HOSPADM

## 2024-11-05 RX ORDER — HYDRALAZINE HYDROCHLORIDE 20 MG/ML
5 INJECTION INTRAMUSCULAR; INTRAVENOUS ONCE AS NEEDED
Status: DISCONTINUED | OUTPATIENT
Start: 2024-11-05 | End: 2024-11-07 | Stop reason: HOSPADM

## 2024-11-05 RX ORDER — KETOROLAC TROMETHAMINE 30 MG/ML
INJECTION, SOLUTION INTRAMUSCULAR; INTRAVENOUS AS NEEDED
Status: DISCONTINUED | OUTPATIENT
Start: 2024-11-05 | End: 2024-11-05

## 2024-11-05 RX ORDER — AMOXICILLIN 250 MG
2 CAPSULE ORAL NIGHTLY PRN
Status: DISCONTINUED | OUTPATIENT
Start: 2024-11-05 | End: 2024-11-07 | Stop reason: HOSPADM

## 2024-11-05 RX ORDER — MORPHINE SULFATE 0.5 MG/ML
INJECTION, SOLUTION EPIDURAL; INTRATHECAL; INTRAVENOUS AS NEEDED
Status: DISCONTINUED | OUTPATIENT
Start: 2024-11-05 | End: 2024-11-05

## 2024-11-05 RX ORDER — BISACODYL 10 MG/1
10 SUPPOSITORY RECTAL DAILY PRN
Status: DISCONTINUED | OUTPATIENT
Start: 2024-11-05 | End: 2024-11-07 | Stop reason: HOSPADM

## 2024-11-05 RX ORDER — DIPHENHYDRAMINE HYDROCHLORIDE 50 MG/ML
25 INJECTION INTRAMUSCULAR; INTRAVENOUS EVERY 4 HOURS PRN
Status: DISCONTINUED | OUTPATIENT
Start: 2024-11-05 | End: 2024-11-07 | Stop reason: HOSPADM

## 2024-11-05 RX ORDER — FAMOTIDINE 10 MG/ML
20 INJECTION INTRAVENOUS ONCE
Status: COMPLETED | OUTPATIENT
Start: 2024-11-05 | End: 2024-11-05

## 2024-11-05 RX ORDER — POLYETHYLENE GLYCOL 3350 17 G/17G
17 POWDER, FOR SOLUTION ORAL 2 TIMES DAILY PRN
Status: DISCONTINUED | OUTPATIENT
Start: 2024-11-05 | End: 2024-11-07 | Stop reason: HOSPADM

## 2024-11-05 RX ORDER — SERTRALINE HYDROCHLORIDE 50 MG/1
25 TABLET, FILM COATED ORAL DAILY
Status: DISCONTINUED | OUTPATIENT
Start: 2024-11-05 | End: 2024-11-07 | Stop reason: HOSPADM

## 2024-11-05 RX ORDER — NALOXONE HYDROCHLORIDE 0.4 MG/ML
0.1 INJECTION, SOLUTION INTRAMUSCULAR; INTRAVENOUS; SUBCUTANEOUS EVERY 5 MIN PRN
Status: DISCONTINUED | OUTPATIENT
Start: 2024-11-05 | End: 2024-11-07 | Stop reason: HOSPADM

## 2024-11-05 RX ORDER — SIMETHICONE 80 MG
80 TABLET,CHEWABLE ORAL 4 TIMES DAILY PRN
Status: DISCONTINUED | OUTPATIENT
Start: 2024-11-05 | End: 2024-11-07 | Stop reason: HOSPADM

## 2024-11-05 RX ORDER — SODIUM CHLORIDE, SODIUM LACTATE, POTASSIUM CHLORIDE, CALCIUM CHLORIDE 600; 310; 30; 20 MG/100ML; MG/100ML; MG/100ML; MG/100ML
20 INJECTION, SOLUTION INTRAVENOUS CONTINUOUS
Status: DISCONTINUED | OUTPATIENT
Start: 2024-11-05 | End: 2024-11-05

## 2024-11-05 SDOH — HEALTH STABILITY: MENTAL HEALTH: WERE YOU ABLE TO COMPLETE ALL THE BEHAVIORAL HEALTH SCREENINGS?: YES

## 2024-11-05 SDOH — SOCIAL STABILITY: SOCIAL INSECURITY: ARE YOU MARRIED, WIDOWED, DIVORCED, SEPARATED, NEVER MARRIED, OR LIVING WITH A PARTNER?: MARRIED

## 2024-11-05 SDOH — HEALTH STABILITY: MENTAL HEALTH: HOW OFTEN DO YOU HAVE A DRINK CONTAINING ALCOHOL?: NEVER

## 2024-11-05 SDOH — HEALTH STABILITY: MENTAL HEALTH
DO YOU FEEL STRESS - TENSE, RESTLESS, NERVOUS, OR ANXIOUS, OR UNABLE TO SLEEP AT NIGHT BECAUSE YOUR MIND IS TROUBLED ALL THE TIME - THESE DAYS?: NOT AT ALL

## 2024-11-05 SDOH — SOCIAL STABILITY: SOCIAL INSECURITY: WITHIN THE LAST YEAR, HAVE YOU BEEN HUMILIATED OR EMOTIONALLY ABUSED IN OTHER WAYS BY YOUR PARTNER OR EX-PARTNER?: NO

## 2024-11-05 SDOH — HEALTH STABILITY: MENTAL HEALTH: CURRENT SMOKER: 0

## 2024-11-05 SDOH — HEALTH STABILITY: MENTAL HEALTH: HOW MANY DRINKS CONTAINING ALCOHOL DO YOU HAVE ON A TYPICAL DAY WHEN YOU ARE DRINKING?: PATIENT DOES NOT DRINK

## 2024-11-05 SDOH — ECONOMIC STABILITY: HOUSING INSECURITY: DO YOU FEEL UNSAFE GOING BACK TO THE PLACE WHERE YOU ARE LIVING?: NO

## 2024-11-05 SDOH — SOCIAL STABILITY: SOCIAL INSECURITY: PHYSICAL ABUSE: DENIES

## 2024-11-05 SDOH — HEALTH STABILITY: PHYSICAL HEALTH: ON AVERAGE, HOW MANY MINUTES DO YOU ENGAGE IN EXERCISE AT THIS LEVEL?: 60 MIN

## 2024-11-05 SDOH — SOCIAL STABILITY: SOCIAL INSECURITY: ARE THERE ANY APPARENT SIGNS OF INJURIES/BEHAVIORS THAT COULD BE RELATED TO ABUSE/NEGLECT?: NO

## 2024-11-05 SDOH — ECONOMIC STABILITY: FOOD INSECURITY: HOW HARD IS IT FOR YOU TO PAY FOR THE VERY BASICS LIKE FOOD, HOUSING, MEDICAL CARE, AND HEATING?: VERY HARD

## 2024-11-05 SDOH — HEALTH STABILITY: PHYSICAL HEALTH
HOW OFTEN DO YOU NEED TO HAVE SOMEONE HELP YOU WHEN YOU READ INSTRUCTIONS, PAMPHLETS, OR OTHER WRITTEN MATERIAL FROM YOUR DOCTOR OR PHARMACY?: NEVER

## 2024-11-05 SDOH — HEALTH STABILITY: MENTAL HEALTH: HOW OFTEN DO YOU HAVE SIX OR MORE DRINKS ON ONE OCCASION?: NEVER

## 2024-11-05 SDOH — SOCIAL STABILITY: SOCIAL NETWORK: HOW OFTEN DO YOU ATTEND CHURCH OR RELIGIOUS SERVICES?: MORE THAN 4 TIMES PER YEAR

## 2024-11-05 SDOH — SOCIAL STABILITY: SOCIAL INSECURITY: HAVE YOU HAD THOUGHTS OF HARMING ANYONE ELSE?: NO

## 2024-11-05 SDOH — ECONOMIC STABILITY: FOOD INSECURITY: WITHIN THE PAST 12 MONTHS, YOU WORRIED THAT YOUR FOOD WOULD RUN OUT BEFORE YOU GOT THE MONEY TO BUY MORE.: NEVER TRUE

## 2024-11-05 SDOH — SOCIAL STABILITY: SOCIAL INSECURITY: ABUSE SCREEN: ADULT

## 2024-11-05 SDOH — SOCIAL STABILITY: SOCIAL INSECURITY: WITHIN THE LAST YEAR, HAVE YOU BEEN AFRAID OF YOUR PARTNER OR EX-PARTNER?: NO

## 2024-11-05 SDOH — HEALTH STABILITY: PHYSICAL HEALTH: ON AVERAGE, HOW MANY DAYS PER WEEK DO YOU ENGAGE IN MODERATE TO STRENUOUS EXERCISE (LIKE A BRISK WALK)?: 7 DAYS

## 2024-11-05 SDOH — HEALTH STABILITY: MENTAL HEALTH: HAVE YOU USED ANY PRESCRIPTION DRUGS OTHER THAN PRESCRIBED IN THE PAST 12 MONTHS?: NO

## 2024-11-05 SDOH — HEALTH STABILITY: MENTAL HEALTH: NON-SPECIFIC ACTIVE SUICIDAL THOUGHTS (PAST 1 MONTH): NO

## 2024-11-05 SDOH — HEALTH STABILITY: MENTAL HEALTH: SUICIDAL BEHAVIOR (LIFETIME): NO

## 2024-11-05 SDOH — SOCIAL STABILITY: SOCIAL INSECURITY: HAVE YOU HAD ANY THOUGHTS OF HARMING ANYONE ELSE?: NO

## 2024-11-05 SDOH — SOCIAL STABILITY: SOCIAL INSECURITY
WITHIN THE LAST YEAR, HAVE YOU BEEN KICKED, HIT, SLAPPED, OR OTHERWISE PHYSICALLY HURT BY YOUR PARTNER OR EX-PARTNER?: NO

## 2024-11-05 SDOH — HEALTH STABILITY: MENTAL HEALTH: WISH TO BE DEAD (PAST 1 MONTH): NO

## 2024-11-05 SDOH — SOCIAL STABILITY: SOCIAL NETWORK: HOW OFTEN DO YOU ATTEND MEETINGS OF THE CLUBS OR ORGANIZATIONS YOU BELONG TO?: MORE THAN 4 TIMES PER YEAR

## 2024-11-05 SDOH — ECONOMIC STABILITY: FOOD INSECURITY: WITHIN THE PAST 12 MONTHS, THE FOOD YOU BOUGHT JUST DIDN'T LAST AND YOU DIDN'T HAVE MONEY TO GET MORE.: NEVER TRUE

## 2024-11-05 SDOH — SOCIAL STABILITY: SOCIAL INSECURITY: HAS ANYONE EVER THREATENED TO HURT YOUR FAMILY OR YOUR PETS?: NO

## 2024-11-05 SDOH — SOCIAL STABILITY: SOCIAL NETWORK: HOW OFTEN DO YOU GET TOGETHER WITH FRIENDS OR RELATIVES?: THREE TIMES A WEEK

## 2024-11-05 SDOH — SOCIAL STABILITY: SOCIAL INSECURITY
WITHIN THE LAST YEAR, HAVE YOU BEEN RAPED OR FORCED TO HAVE ANY KIND OF SEXUAL ACTIVITY BY YOUR PARTNER OR EX-PARTNER?: NO

## 2024-11-05 SDOH — SOCIAL STABILITY: SOCIAL INSECURITY: DO YOU FEEL ANYONE HAS EXPLOITED OR TAKEN ADVANTAGE OF YOU FINANCIALLY OR OF YOUR PERSONAL PROPERTY?: NO

## 2024-11-05 SDOH — SOCIAL STABILITY: SOCIAL NETWORK: IN A TYPICAL WEEK, HOW MANY TIMES DO YOU TALK ON THE PHONE WITH FAMILY, FRIENDS, OR NEIGHBORS?: THREE TIMES A WEEK

## 2024-11-05 SDOH — SOCIAL STABILITY: SOCIAL NETWORK
DO YOU BELONG TO ANY CLUBS OR ORGANIZATIONS SUCH AS CHURCH GROUPS, UNIONS, FRATERNAL OR ATHLETIC GROUPS, OR SCHOOL GROUPS?: YES

## 2024-11-05 SDOH — SOCIAL STABILITY: SOCIAL INSECURITY: ARE YOU OR HAVE YOU BEEN THREATENED OR ABUSED PHYSICALLY, EMOTIONALLY, OR SEXUALLY BY ANYONE?: NO

## 2024-11-05 SDOH — SOCIAL STABILITY: SOCIAL INSECURITY: VERBAL ABUSE: DENIES

## 2024-11-05 SDOH — ECONOMIC STABILITY: TRANSPORTATION INSECURITY: IN THE PAST 12 MONTHS, HAS LACK OF TRANSPORTATION KEPT YOU FROM MEDICAL APPOINTMENTS OR FROM GETTING MEDICATIONS?: NO

## 2024-11-05 SDOH — HEALTH STABILITY: MENTAL HEALTH: HAVE YOU USED ANY SUBSTANCES (CANABIS, COCAINE, HEROIN, HALLUCINOGENS, INHALANTS, ETC.) IN THE PAST 12 MONTHS?: NO

## 2024-11-05 SDOH — SOCIAL STABILITY: SOCIAL INSECURITY: DOES ANYONE TRY TO KEEP YOU FROM HAVING/CONTACTING OTHER FRIENDS OR DOING THINGS OUTSIDE YOUR HOME?: NO

## 2024-11-05 SDOH — HEALTH STABILITY: MENTAL HEALTH: STRENGTHS (MUST CHOOSE TWO): SUPPORT FROM FAMILY;SUPPORT FROM FRIENDS

## 2024-11-05 ASSESSMENT — LIFESTYLE VARIABLES
HOW OFTEN DO YOU HAVE A DRINK CONTAINING ALCOHOL: NEVER
HOW MANY STANDARD DRINKS CONTAINING ALCOHOL DO YOU HAVE ON A TYPICAL DAY: PATIENT DOES NOT DRINK
HOW OFTEN DO YOU HAVE 6 OR MORE DRINKS ON ONE OCCASION: NEVER
AUDIT-C TOTAL SCORE: 0
SKIP TO QUESTIONS 9-10: 1
AUDIT-C TOTAL SCORE: 0
SKIP TO QUESTIONS 9-10: 1
AUDIT-C TOTAL SCORE: 0

## 2024-11-05 ASSESSMENT — PAIN DESCRIPTION - LOCATION
LOCATION: ABDOMEN

## 2024-11-05 ASSESSMENT — PAIN SCALES - GENERAL
PAINLEVEL_OUTOF10: 6
PAIN_LEVEL: 2
PAINLEVEL_OUTOF10: 6
PAINLEVEL_OUTOF10: 5 - MODERATE PAIN
PAINLEVEL_OUTOF10: 5 - MODERATE PAIN
PAINLEVEL_OUTOF10: 0 - NO PAIN

## 2024-11-05 ASSESSMENT — ACTIVITIES OF DAILY LIVING (ADL)
FEEDING YOURSELF: INDEPENDENT
HEARING - LEFT EAR: FUNCTIONAL
DRESSING YOURSELF: INDEPENDENT
ADEQUATE_TO_COMPLETE_ADL: YES
WALKS IN HOME: INDEPENDENT
LACK_OF_TRANSPORTATION: NO
GROOMING: INDEPENDENT
LACK_OF_TRANSPORTATION: NO
TOILETING: INDEPENDENT
JUDGMENT_ADEQUATE_SAFELY_COMPLETE_DAILY_ACTIVITIES: YES
HEARING - RIGHT EAR: FUNCTIONAL
BATHING: INDEPENDENT
PATIENT'S MEMORY ADEQUATE TO SAFELY COMPLETE DAILY ACTIVITIES?: YES

## 2024-11-05 ASSESSMENT — PAIN DESCRIPTION - DESCRIPTORS: DESCRIPTORS: SORE

## 2024-11-05 NOTE — LACTATION NOTE
Lactation Consultant Note  Lactation Consultation  Reason for Consult: Initial assessment  Consultant Name: Dilma Richardson RN    Maternal Information  Has mother  before?: Yes  How long did the mother previously breastfeed?: 3 months  Infant to breast within first 2 hours of birth?: Yes  Exclusive Pump and Bottle Feed: No    Maternal Assessment  Breast Assessment: Soft, Compressible  Nipple Assessment: Intact, Erect  Areola Assessment: Normal    Infant Assessment  Infant Behavior: Awake, Readiness to feed, Feeding cues observed, Rooting response    Feeding Assessment  Nutrition Source: Breastmilk  Feeding Method: Nursing at the breast  Feeding Position: Baby led, Cross - cradle, Cradle, Mother demonstrates good positioning  Suck/Feeding: Sustained, Baby led rhythmically, Coordinated suck/swallow/breathe  Latch Assessment: Eagerly grasped on to latch, Deep latch obtained, Flanged lips, Areolar attachment, Comfortable with no pain    LATCH TOOL  Latch: Grasps breast, tongue down, lips flanged, rhythmic sucking  Audible Swallowing: Spontaneous and intermittent (24 hours old)  Type of Nipple: Everted (After stimulation)  Comfort (Breast/Nipple): Soft/non-tender  Hold (Positioning): No assist from staff, mother able to position/hold infant  LATCH Score: 10    Breast Pump       Other OB Lactation Tools       Patient Follow-up  Inpatient Lactation Follow-up Needed : Yes  Outpatient Lactation Follow-up: Recommended    Other OB Lactation Documentation  Maternal Risk Factors: Age over 30, primiparity,  delivery    Recommendations/Summary  Met with mother. Mother is an experienced breastfeeder. Mother said breastfeeding has been going well so far. While in the room infant started to show feeding cues. Mother put infant to breast. Infant was able to latch right away for a sustained nutritive suck. Mother denies any pain. Reviewed how long and how often to feed. Continuing support offered as needed.

## 2024-11-05 NOTE — ANESTHESIA PREPROCEDURE EVALUATION
Patient: Abigail Mann    Evaluation Method: In-person visit    Procedure Information       Date/Time: 24 0730    Procedures:        Section      OBGYN Delivery  Section - Main OR to provide assistant      Salpingectomy    Location: [facility] OB 02 / [facility] OB    Surgeons: Ca Reyes MD            Relevant Problems   Neuro   (+) Anxiety   (+) Mixed anxiety and depressive disorder      Hematology   (+) Anemia      GYN   (+) Menometrorrhagia   (+) Menorrhagia with regular cycle       Clinical information reviewed:   Tobacco  Allergies  Meds   Med Hx  Surg Hx   Fam Hx          NPO Detail:  NPO/Void Status  Date of Last Liquid: 24  Time of Last Liquid: 2100  Date of Last Solid: 24  Time of Last Solid:          OB/GYN     Physical Exam    Airway  Mallampati: II     Cardiovascular - normal exam     Dental - normal exam     Pulmonary - normal exam     Abdominal - normal exam             Anesthesia Plan    History of general anesthesia?: yes  History of complications of general anesthesia?: no    ASA 2     spinal     The patient is not a current smoker.    Anesthetic plan and risks discussed with patient.

## 2024-11-05 NOTE — ANESTHESIA POSTPROCEDURE EVALUATION
Patient: Abigail Mann    Procedure Summary       Date: 24 Room / Location: [facility] OB 02 / [facility]    Anesthesia Start: 735 Anesthesia Stop: 824    Procedures:        Section      OBGYN Delivery  Section      Salpingectomy Diagnosis:       Previous  section complicating pregnancy ([facility])      Sterilization      (Previous  section complicating pregnancy ([facility]) [O34.219])      (Sterilization [Z30.2])    Surgeons: Ca Reyes MD Responsible Provider: CELESTE Basilio    Anesthesia Type: spinal ASA Status: 2            Anesthesia Type: spinal    Vitals Value Taken Time   /57 24 0840   Temp 36.7 24 0850   Pulse 65 24 0844   Resp 16 24 0825   SpO2 98 % 24 0844       Anesthesia Post Evaluation    Patient location during evaluation: bedside  Patient participation: complete - patient participated  Level of consciousness: awake and alert  Pain score: 2  Pain management: adequate  Airway patency: patent  Cardiovascular status: acceptable  Respiratory status: acceptable  Hydration status: acceptable  Postoperative Nausea and Vomiting: none        No notable events documented.

## 2024-11-05 NOTE — ANESTHESIA PROCEDURE NOTES
Spinal Block    Patient location during procedure: OR  Start time: 11/5/2024 7:38 AM  End time: 11/5/2024 7:39 AM  Reason for block: primary anesthetic and at surgeon's request  Staffing  Performed: MAYURI   Authorized by: CELESTE Basilio    Performed by: CELESTE Basilio    Preanesthetic Checklist  Completed: patient identified, IV checked, risks and benefits discussed, surgical consent, pre-op evaluation, timeout performed and sterile techniques followed  Block Timeout  RN/Licensed healthcare professional reads aloud to the Anesthesia provider and entire team: Patient identity, procedure with side and site, patient position, and as applicable the availability of implants/special equipment/special requirements.  Patient on coagulant treatment: no  Timeout performed at: 11/5/2024 7:38 AM  Spinal Block  Patient position: sitting  Prep: ChloraPrep  Sterility prep: mask, hand hygiene, gloves, drape and cap  Sedation level: no sedation  Patient monitoring: heart rate, continuous pulse oximetry and blood pressure  Approach: midline  Vertebral space: L3-4  Injection technique: single-shot  Needle  Needle type: pecan.   Needle gauge: 24 G  Needle length: 3.5 in  Free flowing CSF: yes    Assessment  Sensory level: T4 bilateral  Block outcome: Allis test negative  Procedure assessment: patient tolerated procedure well with no immediate complications

## 2024-11-05 NOTE — H&P
OB Admission H&P    Assessment/Plan    Abigail Mann is a 39 y.o.  at 39w3d, ROSALBA: 2024, by Ultrasound, who presents for a  delivery and tubal ligation    Scheduled  Section  -NPO, last ate at before midnight   -T&S, CBC, and Syphilis   -T&C: not indicated  -Consent for surgery obtained, discussed risks of procedure including but not limited to: pain, bleeding, infection, damage to internal organs including bowel and bladder, possible blood transfusion.     Fetal Status  -NST reactive, reassuring   -Presentation vtx based on vaginal exam  -EFW 8# by US  -GBS negative    Pregnancy Problems (from 24 to present)       No problems associated with this episode.            Subjective   Good fetal movement.  Denies vaginal bleeding.    Prenatal Provider Dr. Reyes    OB History    Para Term  AB Living   4 2 2 0 1 2   SAB IAB Ectopic Multiple Live Births   1 0 0 0 2      # Outcome Date GA Lbr Bobby/2nd Weight Sex Type Anes PTL Lv   4 Current            3 SAB 23 10w0d    Incomplete S      2 Term 20   3.175 kg F CS-LTranv   DESI   1 Term 10/30/18   3.544 kg M CS-LTranv   DESI       Past Surgical History:   Procedure Laterality Date     SECTION, LOW TRANSVERSE  2020    x 2 & 2018    D&C FIRST TRIMESTER / TX INCOMPLETE / MISSED / SEPTIC / INDUCED       NASAL POLYP SURGERY Right        Social History     Tobacco Use    Smoking status: Never     Passive exposure: Never    Smokeless tobacco: Never   Substance Use Topics    Alcohol use: Not Currently     Comment: pregnant       No Known Allergies    Medications Prior to Admission   Medication Sig Dispense Refill Last Dose/Taking    ascorbic acid (Vitamin C) 500 mg tablet Take by mouth.   2024    cholecalciferol (Vitamin D-3) 25 MCG (1000 UT) tablet Take 1 tablet (25 mcg) by mouth once daily.   2024    prenatal vitamin, iron-folic, (Prenatal) 27 mg iron-800 mcg folic acid tablet Take 1 tablet  by mouth once daily.   11/4/2024    sertraline (Zoloft) 25 mg tablet Take 1 tablet (25 mg) by mouth once daily. 90 tablet 2 11/4/2024    ALPRAZolam (Xanax) 0.25 mg tablet Take 1 tablet (0.25 mg) by mouth once daily as needed for anxiety. 20 tablet 0     docusate sodium (Colace) 100 mg capsule Take 1 capsule (100 mg) by mouth once daily as needed for constipation.       ferrous sulfate, 325 mg ferrous sulfate, tablet Take 1 tablet (325 mg) by mouth every other day.       psyllium (Metamucil) 3.4 gram packet Take 1 packet by mouth once daily.       psyllium (Metamucil) 3.4 gram packet Take 1 packet by mouth 2 times a day. Mix and drink with at least 8 ounces of water or juice.        Objective     Last Vitals  Temp Pulse Resp BP MAP O2 Sat   36.7 °C (98.1 °F) 89 17 123/75 93 100 %     Blood Pressures         11/5/2024  0621             BP: 123/75             Physical Exam  General: NAD, mood appropriate  Cardiopulmonary: warm and well perfused, breathing comfortably on room air  Abdomen: Gravid, non-tender  Extremities: Symmetric  Speculum Exam: deferred  Cervix: deferred     Fetal Monitoring  Baseline: 140 bpm, Variability: moderate,  Accelerations: present and Decelerations: none  Uterine Activity: Irregular contractions  Interpretation: Category 1    Bedside ultrasound: No    Labs in chart were reviewed.          Prenatal labs reviewed, not remarkable.

## 2024-11-05 NOTE — CARE PLAN
The patient's goals for the shift include  have a baby and adequate pain management.    The clinical goals for the shift include safe delivery and recovery

## 2024-11-05 NOTE — L&D DELIVERY NOTE
OB Delivery Note  2024  Abigail Mann  39 y.o.   , Low Transverse       Gestational Age: 39w3d  /Para:   Quantitative Blood Loss: Admission to Discharge: 0 mL (2024  6:03 AM - 2024  8:53 AM)    Lisa Mann [[id number]]      Labor Events    Sac identifier: Sac 1  Rupture date/time: 2024 0950  Rupture type: Artificial  Fluid color: Clear  Labor type:  Without Labor  Labor allowed to proceed with plans for an attempted vaginal birth?: No  Complications: None       Placenta    Placenta delivery date/time: 2024 0751  Placenta removal: Manual removal  Placenta appearance: Intact  Placenta disposition: discarded       Cord    Vessels: 3 vessels  Complications: None  Cord clamped date/time: 2024 07:50:00  Cord blood disposition: Discarded  Gases sent?: No  Stem cell collection (by provider): No       Lacerations    Episiotomy: None       Anesthesia    Method: Spinal       Operative Delivery    Forceps attempted?: No  Vacuum extractor attempted?: No       Shoulder Dystocia    Shoulder dystocia present?: No       [place] Delivery    Time head delivered: 2024 07:50:00  Birth date/time: 2024 07:50:00  Delivery type: , Low Transverse   categorization: repeat   priority: scheduled  Indications for : Repeat   Complications: None       Resuscitation    Method: None       Apgars    Living status: Living  Apgar Component Scores:  1 min.:  5 min.:  10 min.:  15 min.:  20 min.:    Skin color:  1  1       Heart rate:  2  2       Reflex irritability:  2  2       Muscle tone:  2  2       Respiratory effort:  2  2       Total:  9  9       Apgars assigned by: SANJIV CONSTANTINO       Delivery Providers    Delivering clinician: Ca Reyes MD   Provider Role    Sariah Caro, RN Delivery Nurse    Zo Manning, RN Nursery Nurse     Resident    Ivan Freeman RN Delivery Nurse             OB   Delivery Note    Preoperative diagnosis:   previous , declines TOLAC, desires sterilization  Postoperative diagnosis:   same  Procedure:   RLTCS/BPS  Surgeon:   Amy  Assistant:   none  Anesthesia:   spinal  QBL:   480 cc  Findings:   normal anatomy    Informed Consent:  The risks, benefits, complications, and alternatives were discussed with the patient. The patient understood that the risks of  section include, but are not limited to: injury to nearby structures or organs, infection, blood loss and possible need for transfusion, and potential need for more surgery including hysterectomy. The patient stated understanding and desired to proceed. All questions were answered. The site of surgery was properly noted and marked.A Time Out was held and the above information confirmed.    Procedure Details:  The patient was taken to the operating room where Spinal anesthesia was found to be adequate. Antibiotics were given for infection prophylaxis. She was prepped and draped in the normal sterile fashion in the dorsal supine position with leftward tilt. A Pfannenstiel skin incision was made with scalpel. The incision was carried down to the fascia with bovie cauterization. The fascia was incised and extended laterally with Cole scissors. The inferior aspect of the fascia was grasped with Kocher clamps. The underlying rectus and pyramidalis muscles were dissected off with Cole scissors. In a similar fashion, the superior aspect of the fascia was elevated with Kocher clamps, and the rectus muscle was dissected off. The rectus muscles were  down the midline down to the level of the pubic symphysis. The peritoneum was identified and entered . Peritoneal incision was extended superiorly and inferiorly with good visualization of the bladder.    The bladder blade was inserted, vesicouterine peritoneum was identified, and bladder flap created. The lower uterine segment was then incised with a transverse  incision and was extended in a transverse manner. The amniotic sac was ruptured The bladder blade was removed and the infant was delivered atraumatically using the usual maneuvers. The remainder of the infant was delivered, the cord clamped and cut, and the baby was handed off to the awaiting clinicians.     The placenta was removed via manual massage. The uterus was then exteriorized and cleared of all clots and debris. The hysterotomy was repaired with 0 vicryl suture in a running interlocking fashion. A second imbricating layer of the same suture was placed and good hemostasis observed. The ovaries and tubes appeared normal bilaterally. The right tube was grasped with a Banks clamp and the tube was doubled over to include the fimbriated end.  This was doubly tied with 0 plain ties.  The ligated portion was cut and then ends were cauterized.  A similar procedure was performed on the left side.  Hemostasis was adequate.The uterus, tubes, and ovaries were then returned to the abdomen and pelvis. The uterine incision was reinspected and found to be hemostatic. The subfascial spaces were inspected and noted to be hemostatic. The fascia was then reapproximated with 0 vicryl suture in a running continuous stitch. The skin was closed with 4-0 Monocryl sutures.    The patient tolerated the procedure well. Sponge, instrument, and needle counts were correct and the patient was taken to the recovery room in good and stable condition.    Ca Reyes MD

## 2024-11-06 LAB
ERYTHROCYTE [DISTWIDTH] IN BLOOD BY AUTOMATED COUNT: 12.8 % (ref 11.5–14.5)
HCT VFR BLD AUTO: 30 % (ref 36–46)
HGB BLD-MCNC: 9.5 G/DL (ref 12–16)
LABORATORY COMMENT REPORT: NORMAL
MCH RBC QN AUTO: 30.5 PG (ref 26–34)
MCHC RBC AUTO-ENTMCNC: 31.7 G/DL (ref 32–36)
MCV RBC AUTO: 97 FL (ref 80–100)
NRBC BLD-RTO: 0 /100 WBCS (ref 0–0)
PATH REPORT.FINAL DX SPEC: NORMAL
PATH REPORT.GROSS SPEC: NORMAL
PATH REPORT.RELEVANT HX SPEC: NORMAL
PATH REPORT.TOTAL CANCER: NORMAL
PLATELET # BLD AUTO: 158 X10*3/UL (ref 150–450)
RBC # BLD AUTO: 3.11 X10*6/UL (ref 4–5.2)
WBC # BLD AUTO: 11.8 X10*3/UL (ref 4.4–11.3)

## 2024-11-06 PROCEDURE — 85027 COMPLETE CBC AUTOMATED: CPT | Performed by: OBSTETRICS & GYNECOLOGY

## 2024-11-06 PROCEDURE — 2500000001 HC RX 250 WO HCPCS SELF ADMINISTERED DRUGS (ALT 637 FOR MEDICARE OP): Performed by: OBSTETRICS & GYNECOLOGY

## 2024-11-06 PROCEDURE — 2500000002 HC RX 250 W HCPCS SELF ADMINISTERED DRUGS (ALT 637 FOR MEDICARE OP, ALT 636 FOR OP/ED): Performed by: OBSTETRICS & GYNECOLOGY

## 2024-11-06 PROCEDURE — 2500000004 HC RX 250 GENERAL PHARMACY W/ HCPCS (ALT 636 FOR OP/ED): Performed by: OBSTETRICS & GYNECOLOGY

## 2024-11-06 PROCEDURE — 1220000001 HC OB SEMI-PRIVATE ROOM DAILY

## 2024-11-06 ASSESSMENT — PAIN DESCRIPTION - LOCATION
LOCATION: ABDOMEN
LOCATION: NECK

## 2024-11-06 ASSESSMENT — PAIN DESCRIPTION - DESCRIPTORS
DESCRIPTORS: SHARP
DESCRIPTORS: SHARP;SHOOTING
DESCRIPTORS: SHARP

## 2024-11-06 ASSESSMENT — PAIN SCALES - GENERAL
PAINLEVEL_OUTOF10: 6
PAINLEVEL_OUTOF10: 5 - MODERATE PAIN
PAINLEVEL_OUTOF10: 9
PAINLEVEL_OUTOF10: 3
PAINLEVEL_OUTOF10: 6
PAINLEVEL_OUTOF10: 5 - MODERATE PAIN
PAINLEVEL_OUTOF10: 2
PAINLEVEL_OUTOF10: 5 - MODERATE PAIN
PAINLEVEL_OUTOF10: 7

## 2024-11-06 ASSESSMENT — PAIN DESCRIPTION - ORIENTATION: ORIENTATION: RIGHT

## 2024-11-06 NOTE — PROGRESS NOTES
Postpartum Progress Note    Assessment/Plan   Abigail Mann is a 39 y.o., , who delivered at 39w3d gestation and is now postpartum day 1.    #Postpartum  -S/p RLTCS, BS on 24  -Meeting postpartum milestones appropriately  -Ambulating, tolerating PO, pain well controlled, urinating spontaneously   -Baby girl  -Contraception s/p tubal  -Continue routine postpartum care   -Anticipate DC home on postpartum day 2-3  -Patient to follow up PP in the office in 6 weeks    #Anemia  -Latest hgb 9.5 this AM  -Discussed continuing PO iron    Hospital course: no complications   section delivery  Patient is currently breastfeeding. The patient's blood type is A POS.     Subjective     When seen this morning, patient is doing well.  She reports a good mood. She is eating and drinking without issue. She is ambulating. She is urinating spontaneously and passing flatus. She has not had a bowel movement. Her pain is well controlled. She is breast feeding. Her postpartum contraception plan is tubal.    She denies headaches, vision changes, CP, SOB, RUQ pain, calf tenderness.       Objective   Allergies:   Patient has no known allergies.         Last Vitals:  Temp Pulse Resp BP MAP Pulse Ox   36.7 °C (98.1 °F) 85 18 129/75 94 98 %     Vitals Min/Max Last 24 Hours:  Temp  Min: 36.7 °C (98 °F)  Max: 37.1 °C (98.7 °F)  Pulse  Min: 55  Max: 88  Resp  Min: 16  Max: 18  BP  Min: 99/62  Max: 184/150  MAP (mmHg)  Min: 75  Max: 161    Intake/Output:     Intake/Output Summary (Last 24 hours) at 2024 0845  Last data filed at 2024 2100  Gross per 24 hour   Intake --   Output 2581 ml   Net -2581 ml     Physical Exam:  General: Alert and oriented, in no acute distress.  Psych: Normal affect and mood. Able to answer questions appropriately.   Heart: Regular rate and rhythm.  Lungs: Clear to auscultation bilaterally.  Abdomen: Soft, non-tender, no rebound or guarding.  Uterus: Firm, below the umbilicus, not  tender.  Incision: Bandage removed and incision appears clean, dry, intact   Extremities: No calf tenderness bilaterally     Lab Data:  Lab Results   Component Value Date    WBC 11.8 (H) 11/06/2024    HGB 9.5 (L) 11/06/2024    HCT 30.0 (L) 11/06/2024     11/06/2024

## 2024-11-06 NOTE — ANESTHESIA POSTPROCEDURE EVALUATION
Patient: Abigail Gamboaocik    Procedure Summary       Date: 24 Room / Location: [facility] OB 02 / [facility] OB    Anesthesia Start: 735 Anesthesia Stop: 824    Procedures:        Section      OBGYN Delivery  Section      Salpingectomy Diagnosis:       Previous  section complicating pregnancy ([facility])      Sterilization      (Previous  section complicating pregnancy ([facility]) [O34.219])      (Sterilization [Z30.2])    Surgeons: Ca Reyes MD Responsible Provider: CELESTE Basilio    Anesthesia Type: spinal ASA Status: 2            Anesthesia Type: spinal    Vitals Value Taken Time   /68 24 1040   Temp 36.3 24 2113   Pulse 55 24 1040   Resp 16 24 0825   SpO2 99 % 24 1039       Anesthesia Post Evaluation    Patient location during evaluation: bedside  Patient participation: complete - patient participated  Level of consciousness: awake and alert  Pain management: satisfactory to patient  Multimodal analgesia pain management approach  Airway patency: patent  Cardiovascular status: acceptable  Respiratory status: acceptable  Hydration status: acceptable  Postoperative Nausea and Vomiting: none  Comments: Pt. Up ad prem without difficulty. Using restroom without restriction. Tolerating PO liquids/food without nausea or vomiting. No residual numbness or tingling. No headache or back ache. No redness, swelling, or drainage at spinal site. Pain well controlled. Pt. Does report the sensation of having trouble taking a deep breath, soreness in abdomen/side. SaO2 98%, lung sounds clear, does not appear to be in acute distress. Patient using incentive spirometer. No other concerns at this time.        No notable events documented.

## 2024-11-06 NOTE — CARE PLAN
The patient's goals for the shift include rest and breast feeding    The clinical goals for the shift include pain control, rest, successful breast feeding      Problem: Vaginal Birth or  Section  Goal: No s/sx of infection through recovery  Outcome: Progressing  Goal: No s/sx of hemorrhage through recovery  Outcome: Progressing     Problem: Postpartum  Goal: Experiences normal postpartum course  Outcome: Progressing  Goal: Appropriate maternal -  bonding  Outcome: Progressing  Goal: Establish and maintain infant feeding pattern for adequate nutrition  Outcome: Progressing  Goal: Incisions, wounds, or drain sites healing without S/S of infection  Outcome: Progressing  Goal: No s/sx infection  Outcome: Progressing  Goal: No s/sx of hemorrhage  Outcome: Progressing  Goal: Minimal s/sx of HDP and BP<160/110  Outcome: Progressing     Problem:  Recovery Care  Goal: Verbalizes understanding of post-op instructions  Outcome: Progressing  Goal: Manages discomfort  Outcome: Progressing  Goal: Dressing intact until removed with any drainage marked  Outcome: Progressing  Goal: Patient vital signs are stable  Outcome: Progressing  Goal: Urine output is 0.5 mL/kg/hr or more  Outcome: Progressing  Goal: Fundus firm at midline  Outcome: Progressing     Problem: Nausea/Vomiting  Goal: Adequate urine output (0.5 ml/kg/hr)  Outcome: Progressing  Goal: Free from nausea/vomiting  Outcome: Progressing  Goal: Tolerates prescribed diet  Outcome: Progressing  Goal: Weight maintenance or gain  Outcome: Progressing  Goal: Achieve/maintain normal electrolyte level  Outcome: Progressing     Problem: Infection  Goal: Fever/diaphoresis will improve to <38.0 C  Outcome: Progressing  Goal: Wound will have less exudate and warmth  Outcome: Progressing  Goal: Improvement in s/sx of infection  Outcome: Progressing     Problem: Pain - Adult  Goal: Verbalizes/displays adequate comfort level or baseline comfort level  Outcome:  Progressing     Problem: Safety - Adult  Goal: Free from fall injury  Outcome: Progressing     Problem: Discharge Planning  Goal: Discharge to home or other facility with appropriate resources  Outcome: Progressing

## 2024-11-06 NOTE — CARE PLAN
The patient's goals for the shift include rest and breast feeding    The clinical goals for the shift include pain control, rest, successful breast feeding

## 2024-11-07 ENCOUNTER — PHARMACY VISIT (OUTPATIENT)
Dept: PHARMACY | Facility: CLINIC | Age: 39
End: 2024-11-07
Payer: MEDICARE

## 2024-11-07 VITALS
BODY MASS INDEX: 24.86 KG/M2 | RESPIRATION RATE: 18 BRPM | HEART RATE: 94 BPM | DIASTOLIC BLOOD PRESSURE: 75 MMHG | TEMPERATURE: 97.9 F | SYSTOLIC BLOOD PRESSURE: 121 MMHG | WEIGHT: 164 LBS | OXYGEN SATURATION: 97 % | HEIGHT: 68 IN

## 2024-11-07 PROBLEM — O34.219 PREVIOUS CESAREAN SECTION COMPLICATING PREGNANCY (HHS-HCC): Status: RESOLVED | Noted: 2024-09-12 | Resolved: 2024-11-07

## 2024-11-07 PROBLEM — O34.219 PREVIOUS CESAREAN DELIVERY AFFECTING PREGNANCY (HHS-HCC): Status: RESOLVED | Noted: 2024-11-05 | Resolved: 2024-11-07

## 2024-11-07 PROCEDURE — 2500000005 HC RX 250 GENERAL PHARMACY W/O HCPCS: Performed by: OBSTETRICS & GYNECOLOGY

## 2024-11-07 PROCEDURE — 2500000001 HC RX 250 WO HCPCS SELF ADMINISTERED DRUGS (ALT 637 FOR MEDICARE OP): Performed by: OBSTETRICS & GYNECOLOGY

## 2024-11-07 PROCEDURE — RXMED WILLOW AMBULATORY MEDICATION CHARGE

## 2024-11-07 RX ORDER — LIDOCAINE 560 MG/1
1 PATCH PERCUTANEOUS; TOPICAL; TRANSDERMAL
Qty: 15 PATCH | Refills: 0 | Status: SHIPPED | OUTPATIENT
Start: 2024-11-07

## 2024-11-07 RX ORDER — ACETAMINOPHEN 325 MG/1
650 TABLET ORAL EVERY 6 HOURS PRN
Qty: 120 TABLET | Refills: 0 | Status: SHIPPED | OUTPATIENT
Start: 2024-11-07 | End: 2024-12-07

## 2024-11-07 RX ORDER — OXYCODONE HYDROCHLORIDE 5 MG/1
5 TABLET ORAL EVERY 4 HOURS PRN
Qty: 15 TABLET | Refills: 0 | Status: SHIPPED | OUTPATIENT
Start: 2024-11-07

## 2024-11-07 RX ORDER — IBUPROFEN 600 MG/1
600 TABLET ORAL EVERY 6 HOURS PRN
Qty: 120 TABLET | Refills: 0 | Status: SHIPPED | OUTPATIENT
Start: 2024-11-07 | End: 2024-12-07

## 2024-11-07 ASSESSMENT — PAIN DESCRIPTION - DESCRIPTORS: DESCRIPTORS: ACHING

## 2024-11-07 ASSESSMENT — PAIN SCALES - GENERAL: PAINLEVEL_OUTOF10: 3

## 2024-11-07 NOTE — CARE PLAN
Problem: Vaginal Birth or  Section  Goal: No s/sx of infection through recovery  Outcome: Progressing  Goal: No s/sx of hemorrhage through recovery  Outcome: Progressing     Problem: Postpartum  Goal: Experiences normal postpartum course  Outcome: Progressing  Goal: Appropriate maternal -  bonding  Outcome: Progressing  Goal: Establish and maintain infant feeding pattern for adequate nutrition  Outcome: Progressing  Goal: Incisions, wounds, or drain sites healing without S/S of infection  Outcome: Progressing  Goal: No s/sx infection  Outcome: Progressing  Goal: No s/sx of hemorrhage  Outcome: Progressing  Goal: Minimal s/sx of HDP and BP<160/110  Outcome: Progressing     Problem:  Recovery Care  Goal: Verbalizes understanding of post-op instructions  Outcome: Progressing  Goal: Manages discomfort  Outcome: Progressing  Goal: Dressing intact until removed with any drainage marked  Outcome: Progressing  Goal: Patient vital signs are stable  Outcome: Progressing  Goal: Urine output is 0.5 mL/kg/hr or more  Outcome: Progressing  Goal: Fundus firm at midline  Outcome: Progressing     Problem: Nausea/Vomiting  Goal: Adequate urine output (0.5 ml/kg/hr)  Outcome: Progressing  Goal: Free from nausea/vomiting  Outcome: Progressing  Goal: Tolerates prescribed diet  Outcome: Progressing  Goal: Weight maintenance or gain  Outcome: Progressing  Goal: Achieve/maintain normal electrolyte level  Outcome: Progressing     Problem: Infection  Goal: Fever/diaphoresis will improve to <38.0 C  Outcome: Progressing  Goal: Wound will have less exudate and warmth  Outcome: Progressing  Goal: Improvement in s/sx of infection  Outcome: Progressing     Problem: Pain - Adult  Goal: Verbalizes/displays adequate comfort level or baseline comfort level  Outcome: Progressing     Problem: Safety - Adult  Goal: Free from fall injury  Outcome: Progressing     Problem: Discharge Planning  Goal: Discharge to home or other  facility with appropriate resources  Outcome: Progressing   The patient's goals for the shift include pain control discharge home    The clinical goals for the shift include pain control, discharage to home

## 2024-11-07 NOTE — PROGRESS NOTES
Postpartum Progress Note    Assessment/Plan   Abigail Mann is a 39 y.o., , who delivered at 39w3d gestation and is now postpartum day 2.    -Continue postpartum/postoperative care  -Reviewed pelvic rest for 6 weeks, prescriptions, and follow-up.    Assessment & Plan  Previous  delivery affecting pregnancy ([facility])    Previous  section complicating pregnancy ([facility])    Sterilization    Pregnancy Problems (from 24 to present)       No problems associated with this episode.          Hospital course: no complications   section delivery  Patient is currently breastfeedingThe patient's blood type is A POS.  Rhogam is not indicated.    Subjective   Her pain is well controlled with current medications  She is passing flatus  She is ambulating well  She is tolerating a Adult diet Regular  She reports no breast or nursing problems  She denies emotional concerns today   Her plan for contraception is tubal ligation       Objective   Allergies:   Patient has no known allergies.         Last Vitals:  Temp Pulse Resp BP MAP Pulse Ox   36.7 °C (98.1 °F) 94 16 121/75 93 97 %     Vitals Min/Max Last 24 Hours:  Temp  Min: 36.4 °C (97.6 °F)  Max: 36.8 °C (98.3 °F)  Pulse  Min: 85  Max: 96  Resp  Min: 16  Max: 18  BP  Min: 121/75  Max: 133/81  MAP (mmHg)  Min: 93  Max: 101    Intake/Output:   No intake or output data in the 24 hours ending 24 0826    Physical Exam:  General: Examination reveals a pleasant WN/WD/obese female, in no acute distress. She is alert and cooperative.  HEENT: NC/AT  HRRR  Good pulses  Lungs:  Normal effort.  Abdomen: soft, nontender, nondistended, no abnormal masses, no epigastric pain.   Fundus: firm, below the umbilicus  Extremities: no redness or tenderness in the calves or thighs; trace edema.   Skin:  No acute rash or lesions. Incision - C/D/I w/wo steri-strips  Psych: Bonding well with baby and partner. Appropiate mood and affect.    Lab Data:  Lab Results  "  Component Value Date    ABO A 11/05/2024    LABRH POS 11/05/2024    ABSCRN NEG 11/05/2024     Lab Results   Component Value Date    WBC 11.8 (H) 11/06/2024    HGB 9.5 (L) 11/06/2024    HCT 30.0 (L) 11/06/2024     11/06/2024     0   Lab Value Date/Time    GRPBSTREP No Group B Streptococcus (GBS) isolated 10/17/2024 0833     No results found for: \"GLUCOSE\", \"NA\", \"K\", \"CL\", \"CO2\", \"ANIONGAP\", \"BUN\", \"CREATININE\", \"EGFR\", \"CALCIUM\", \"ALBUMIN\", \"PROT\", \"ALKPHOS\", \"ALT\", \"AST\", \"BILITOT\"  No results found for: \"UTPCR\"  "

## 2024-11-07 NOTE — DISCHARGE SUMMARY
Discharge Summary    Admission Date: 2024  Discharge Date: 2024    Discharge Diagnosis  Previous  delivery affecting pregnancy ([facility])    Hospital Course  Delivery Date: 2024 7:50 AM  Delivery type: , Low Transverse   GA at delivery: 39w3d  Outcome: Living  Anesthesia during delivery: Spinal  Intrapartum complications: None  Feeding method: Breastfeeding Status: Yes     Procedures: tubal ligation  Contraception at discharge: tubal ligation    Pertinent Physical Exam At Time of Discharge  General: Examination reveals a pleasant WN/WD/female, in no acute distress. She is alert and cooperative.  HEENT: NC/AT  HRRR  Good pulses  Lungs:  Normal effort.  Abdomen: soft, nontender, nondistended, no abnormal masses, no epigastric pain.   Fundus: firm, below the umbilicus  Extremities: no redness or tenderness in the calves or thighs; trace edema.   Skin:  No acute rash or lesions. Incision - C/D/I w/wo steri-strips  Psych: Bonding well with baby and partner. Appropiate mood and affect.      Last Vitals:  Temp Pulse Resp BP MAP Pulse Ox   36.7 °C (98.1 °F) 94 16 121/75 93 97 %     Discharge Meds     Your medication list        ASK your doctor about these medications        Instructions Last Dose Given Next Dose Due   ALPRAZolam 0.25 mg tablet  Commonly known as: Xanax      Take 1 tablet (0.25 mg) by mouth once daily as needed for anxiety.       cholecalciferol 25 MCG (1000 UT) tablet  Commonly known as: Vitamin D-3           docusate sodium 100 mg capsule  Commonly known as: Colace           ferrous sulfate (325 mg ferrous sulfate) tablet           Prenatal 28 mg iron- 800 mcg tablet  Generic drug: prenatal vitamin (iron-folic)           psyllium 3.4 gram packet  Commonly known as: Metamucil           psyllium 3.4 gram packet  Commonly known as: Metamucil           sertraline 25 mg tablet  Commonly known as: Zoloft      Take 1 tablet (25 mg) by mouth once daily.       Vitamin C 500 mg  tablet  Generic drug: ascorbic acid                     Complications Requiring Follow-Up  Postpartum recovery    Test Results Pending At Discharge  Pending Labs       No current pending labs.            Outpatient Follow-Up  Future Appointments   Date Time Provider Department Center   2/14/2025 11:30 AM Mike Griffith MD [id number] [place]       I spent 20 minutes in the professional and overall care of this patient.      Jamee Zaragoza, DO

## 2024-11-07 NOTE — DISCHARGE INSTRUCTIONS
"After Your Delivery Discharge Instructions     After Discharge Orders:  Schedule follow up in office for 6 week postpartum visit:    Nothing in the vagina (including sex) until follow-up.  May sign consent for sterilization if desired, or get scheduled if 30 days have passed since consent.   May order extended birth control implant if desired (ie. Any IUD or Nexplanon).   May begin Progestin-only birth control pills on a Sunday, or obtain the Depo Provera injection after 2 weeks postpartum  5.   Your birth control is not effective for the first 2 weeks of use.      Call the Physician with any of these postpartum signs and symptoms:     Warning signs regarding incision or laceration repair:  \"Popping\" of stitches or staples  Foul smelling discharge or pus  More redness or streaks around incision than before     Incision/Laceration care:  No tub baths until OK'd by your Physician   Witch hazel pads, peribottle and rinsing with shower head okay to cleanse.  External genital spray for vulva-perineal-hemorrhoidal pain acceptable for use (ie. Dermoplast) as directed on product.  External lidocaine ointment for vulva-perineal-hemorrhoidal pain acceptable for use as directed on product.       After your delivery - signs and symptoms to watch for:  Fever - Oral temperature greater than 100.4 degrees Fahrenheit  Foul-smelling vaginal discharge  Headache unrelieved by \"pain meds\"  Difficulty urinating  Breasts reddened, hard, hot to the touch  Nipple discharge which is foul-smelling or contains pus  Increased pain at the site of the incision  Difficulty breathing with or without chest pain  New calf pain especially if only on one side  Sudden, continuing increased vaginal bleeding with or without clots  Unrelieved feelings of:  Inability to cope  Sadness  Anxiety  Lack of interest in baby  Insomnia  Crying     What to do at home:  See patient education handouts for full information  Resume activity gradually   Don't lift " anything heavier than baby and carrier until OK'd by your Physician or Midwife  Don't drive for 1 week after having your baby  Try not to lift over 25 lbs during postpartum recovery  No sex until OK'd by your Physician or Midwife  Take care of yourself by sleeping/resting as much as possible  Eat regular nutritious meals  Let someone else care for you, your baby, and housework as much as possible   Take pain medication as prescribed whenever you need them  Wear compression stockings if prescribed   To avoid/relieve constipation take stool softeners if advised   Drink lots of water/fruit juices  Increase fiber in your diet     Refer to [facility] Discharge Instructions for problems or follow-up regarding  Feeding/nursing

## 2024-11-07 NOTE — CARE PLAN
Problem: Vaginal Birth or  Section  Goal: No s/sx of infection through recovery  2024 by Cara Oneal RN  Outcome: Met  2024 09 by Cara Oneal RN  Outcome: Progressing  Goal: No s/sx of hemorrhage through recovery  2024 by Cara Oneal RN  Outcome: Met  2024 09 by Cara Oneal RN  Outcome: Progressing     Problem: Postpartum  Goal: Experiences normal postpartum course  2024 by Cara Oneal RN  Outcome: Met  2024 09 by Cara Oneal RN  Outcome: Progressing  Goal: Appropriate maternal -  bonding  2024 by Cara Oneal RN  Outcome: Met  2024 09 by Cara Oneal RN  Outcome: Progressing  Goal: Establish and maintain infant feeding pattern for adequate nutrition  2024 by Cara Oneal RN  Outcome: Met  2024 09 by Cara Oneal RN  Outcome: Progressing  Goal: Incisions, wounds, or drain sites healing without S/S of infection  2024 by Cara Oneal RN  Outcome: Met  2024 09 by Cara Oneal RN  Outcome: Progressing  Goal: No s/sx infection  2024 by Cara Oneal RN  Outcome: Met  2024 09 by Cara Oneal RN  Outcome: Progressing  Goal: No s/sx of hemorrhage  2024 by Cara Oneal RN  Outcome: Met  2024 09 by Cara Oneal RN  Outcome: Progressing  Goal: Minimal s/sx of HDP and BP<160/110  2024 by Cara Oneal RN  Outcome: Met  2024 09 by Cara Oneal RN  Outcome: Progressing     Problem:  Recovery Care  Goal: Verbalizes understanding of post-op instructions  2024 by Cara Oneal RN  Outcome: Met  2024 09 by Cara Oneal RN  Outcome: Progressing  Goal: Manages discomfort  2024 1216 by Cara Oneal RN  Outcome: Met  2024 0921 by Cara Oneal RN  Outcome: Progressing  Goal: Dressing intact until removed with any drainage marked  2024 1216 by Cara Oneal,  RN  Outcome: Met  11/7/2024 0921 by Cara Oneal RN  Outcome: Progressing  Goal: Patient vital signs are stable  11/7/2024 1216 by Cara Oneal RN  Outcome: Met  11/7/2024 0921 by Cara Oneal RN  Outcome: Progressing  Goal: Urine output is 0.5 mL/kg/hr or more  11/7/2024 1216 by Cara Oneal RN  Outcome: Met  11/7/2024 0921 by Cara Oneal RN  Outcome: Progressing  Goal: Fundus firm at midline  11/7/2024 1216 by Cara Oneal RN  Outcome: Met  11/7/2024 0921 by Cara Oneal RN  Outcome: Progressing     Problem: Nausea/Vomiting  Goal: Adequate urine output (0.5 ml/kg/hr)  11/7/2024 1216 by Cara Oneal RN  Outcome: Met  11/7/2024 0921 by Cara Oneal RN  Outcome: Progressing  Goal: Free from nausea/vomiting  11/7/2024 1216 by Cara Oneal RN  Outcome: Met  11/7/2024 0921 by Cara Onael RN  Outcome: Progressing  Goal: Tolerates prescribed diet  11/7/2024 1216 by Cara Oneal RN  Outcome: Met  11/7/2024 0921 by Cara Oneal RN  Outcome: Progressing  Goal: Weight maintenance or gain  11/7/2024 1216 by Cara Oneal RN  Outcome: Met  11/7/2024 0921 by Cara Oneal RN  Outcome: Progressing  Goal: Achieve/maintain normal electrolyte level  11/7/2024 1216 by Cara Oneal RN  Outcome: Met  11/7/2024 0921 by Cara Oneal RN  Outcome: Progressing     Problem: Infection  Goal: Fever/diaphoresis will improve to <38.0 C  11/7/2024 1216 by Cara Oneal RN  Outcome: Met  11/7/2024 0921 by Cara Oneal RN  Outcome: Progressing  Goal: Wound will have less exudate and warmth  11/7/2024 1216 by Cara Oneal RN  Outcome: Met  11/7/2024 0921 by Cara Oneal RN  Outcome: Progressing  Goal: Improvement in s/sx of infection  11/7/2024 1216 by Cara Oneal RN  Outcome: Met  11/7/2024 0921 by Cara Oneal RN  Outcome: Progressing     Problem: Pain - Adult  Goal: Verbalizes/displays adequate comfort level or baseline comfort level  11/7/2024 1216 by Cara Oneal,  RN  Outcome: Met  11/7/2024 0921 by Cara Oneal RN  Outcome: Progressing     Problem: Safety - Adult  Goal: Free from fall injury  11/7/2024 1216 by Cara Oneal RN  Outcome: Met  11/7/2024 0921 by Cara Oneal RN  Outcome: Progressing     Problem: Discharge Planning  Goal: Discharge to home or other facility with appropriate resources  11/7/2024 1216 by Cara Oneal RN  Outcome: Met  11/7/2024 0921 by Cara Oneal RN  Outcome: Progressing   The patient's goals for the shift include pain control discharge home    The clinical goals for the shift include pain control, discharage to home

## 2024-11-07 NOTE — LACTATION NOTE
Lactation Consultant Note      Met with mother. Mother stated breastfeeding is going well. Mother stated her milk has come in. Mother stated she does not need any education at this time. Reviewed lactation resources. Mother has pump at home. Continuing support offered as needed.    Plan: Offer both breasts every feed 10-12 times per day. Call lactation services with any questions or concerns at [phone].

## 2024-11-26 ENCOUNTER — TELEPHONE (OUTPATIENT)
Dept: OBSTETRICS AND GYNECOLOGY | Facility: CLINIC | Age: 39
End: 2024-11-26
Payer: COMMERCIAL

## 2024-11-26 NOTE — TELEPHONE ENCOUNTER
3 weeks pp  Pt started bleeding last night, dark red, and still bleeding some this morning.  Denies saturating a pad every hour or less.  Previously had no bleeding so was alarmed when she started bleeding, wanting to make sure this is normal.  Reassured pt is normal, reviewed bleeding protocol with pt, to ED if saturating a pad every hour or less.

## 2024-12-12 ENCOUNTER — PATIENT MESSAGE (OUTPATIENT)
Dept: PRIMARY CARE | Facility: CLINIC | Age: 39
End: 2024-12-12
Payer: COMMERCIAL

## 2024-12-12 DIAGNOSIS — F41.9 ANXIETY: ICD-10-CM

## 2024-12-12 NOTE — TELEPHONE ENCOUNTER
LV 2/9/24 RICKEY Mejia 2/14/25 Nacho    Zoloft 25 mg    [facility] [id number], [place]   Name band;

## 2024-12-14 RX ORDER — SERTRALINE HYDROCHLORIDE 25 MG/1
25 TABLET, FILM COATED ORAL DAILY
Qty: 90 TABLET | Refills: 0 | Status: SHIPPED | OUTPATIENT
Start: 2024-12-14

## 2024-12-14 RX ORDER — SERTRALINE HYDROCHLORIDE 25 MG/1
25 TABLET, FILM COATED ORAL DAILY
Qty: 90 TABLET | Refills: 2 | OUTPATIENT
Start: 2024-12-14

## 2024-12-17 NOTE — PROGRESS NOTES
Shashi Danielle is a 39 y.o. female who presents for a postpartum visit. She is 6 week postpartum. I have fully reviewed the prenatal and intrapartum course.   The delivery was at term.  Type of delivery   RLTCS   Complications - none  Place of delivery - [facility]  Postpartum course has been    normal   Baby's course has been    normal.   Baby is feeding by   breast  Baby's name     Baby's weight     Bleeding no bleeding.  Patient is not sexually active.   Contraception method is    BPS.   Postpartum depression screening: negative.    Review of Systems     Objective   LMP 02/04/2024    General:  Well developed, well nourished in NAD, alert and oriented    Breasts:  ideferred                Abdomen: normal                 Perineum: normal         Pelvic support: normal    Vulva: normal   Vagina: normal vagina   Cervix:  no lesions   Corpus: normal   Adnexa:  normal adnexa   Rectal Exam: deferred     Assessment/Plan   Assessment & Plan  Postpartum exam ([facility])         Encounter for gynecological examination without abnormal finding              postpartum exam.     Follow up in: 1 year  or as needed.

## 2024-12-18 ENCOUNTER — POSTPARTUM VISIT (OUTPATIENT)
Dept: OBSTETRICS AND GYNECOLOGY | Facility: CLINIC | Age: 39
End: 2024-12-18
Payer: COMMERCIAL

## 2024-12-18 VITALS
WEIGHT: 141 LBS | BODY MASS INDEX: 21.37 KG/M2 | HEIGHT: 68 IN | SYSTOLIC BLOOD PRESSURE: 114 MMHG | DIASTOLIC BLOOD PRESSURE: 72 MMHG

## 2024-12-18 DIAGNOSIS — Z01.419 ENCOUNTER FOR GYNECOLOGICAL EXAMINATION WITHOUT ABNORMAL FINDING: ICD-10-CM

## 2024-12-18 PROCEDURE — 99214 OFFICE O/P EST MOD 30 MIN: CPT | Performed by: OBSTETRICS & GYNECOLOGY

## 2024-12-18 PROCEDURE — 0503F POSTPARTUM CARE VISIT: CPT | Performed by: OBSTETRICS & GYNECOLOGY

## 2024-12-18 ASSESSMENT — EDINBURGH POSTNATAL DEPRESSION SCALE (EPDS)
THINGS HAVE BEEN GETTING ON TOP OF ME: NO, I HAVE BEEN COPING AS WELL AS EVER
I HAVE FELT SCARED OR PANICKY FOR NO GOOD REASON: NO, NOT AT ALL
TOTAL SCORE: 1
I HAVE LOOKED FORWARD WITH ENJOYMENT TO THINGS: AS MUCH AS I EVER DID
I HAVE BEEN ABLE TO LAUGH AND SEE THE FUNNY SIDE OF THINGS: AS MUCH AS I ALWAYS COULD
I HAVE BEEN ANXIOUS OR WORRIED FOR NO GOOD REASON: HARDLY EVER
I HAVE BEEN SO UNHAPPY THAT I HAVE BEEN CRYING: NO, NEVER
I HAVE BLAMED MYSELF UNNECESSARILY WHEN THINGS WENT WRONG: NO, NEVER
I HAVE BEEN SO UNHAPPY THAT I HAVE HAD DIFFICULTY SLEEPING: NOT AT ALL
THE THOUGHT OF HARMING MYSELF HAS OCCURRED TO ME: NEVER
I HAVE FELT SAD OR MISERABLE: NO, NOT AT ALL

## 2024-12-18 ASSESSMENT — LIFESTYLE VARIABLES
SKIP TO QUESTIONS 9-10: 1
HOW OFTEN DO YOU HAVE A DRINK CONTAINING ALCOHOL: NEVER
HOW OFTEN DO YOU HAVE SIX OR MORE DRINKS ON ONE OCCASION: NEVER
HOW MANY STANDARD DRINKS CONTAINING ALCOHOL DO YOU HAVE ON A TYPICAL DAY: PATIENT DOES NOT DRINK
AUDIT-C TOTAL SCORE: 0

## 2024-12-18 ASSESSMENT — ENCOUNTER SYMPTOMS
DEPRESSION: 0
OCCASIONAL FEELINGS OF UNSTEADINESS: 0
LOSS OF SENSATION IN FEET: 0

## 2024-12-18 ASSESSMENT — PAIN SCALES - GENERAL: PAINLEVEL_OUTOF10: 0-NO PAIN

## 2025-01-06 LAB
CYTOLOGY CMNT CVX/VAG CYTO-IMP: NORMAL
HPV HR 12 DNA GENITAL QL NAA+PROBE: NEGATIVE
HPV HR GENOTYPES PNL CVX NAA+PROBE: NEGATIVE
HPV16 DNA SPEC QL NAA+PROBE: NEGATIVE
HPV18 DNA SPEC QL NAA+PROBE: NEGATIVE
LAB AP HPV GENOTYPE QUESTION: YES
LAB AP HPV HR: NORMAL
LABORATORY COMMENT REPORT: NORMAL
LABORATORY COMMENT REPORT: NORMAL
PATH REPORT.TOTAL CANCER: NORMAL

## 2025-01-07 NOTE — PROGRESS NOTES
Repeat Pap    Last pap obscured by too much blood.  Unable to read.     Objective   There were no vitals taken for this visit.   General:   Alert and oriented, in no acute distress   Neck: Supple. No visible thyromegaly.    Breast/Axilla: Normal to palpation bilaterally without masses, skin changes, or nipple discharge.    Abdomen: Soft, non-tender, without masses or organomegaly   Vulva: Normal architecture without erythema, masses, or lesions.    Vagina: Normal mucosa without lesions, masses, or atrophy. No abnormal vaginal discharge.    Cervix: Normal without masses, lesions, or signs of cervicitis   Uterus: Normal, mobile, non-enlarged uterus   Adnexa: Normal without masses or lesions   Pelvic Floor normal   Psych Normal affect. Normal mood.        Assessment/Plan   Assessment & Plan  Pap smear for cervical cancer screening    Orders:    THINPREP PAP TEST

## 2025-01-09 ENCOUNTER — OFFICE VISIT (OUTPATIENT)
Dept: OBSTETRICS AND GYNECOLOGY | Facility: CLINIC | Age: 40
End: 2025-01-09
Payer: COMMERCIAL

## 2025-01-09 VITALS — BODY MASS INDEX: 21.37 KG/M2 | HEIGHT: 68 IN | WEIGHT: 141 LBS

## 2025-01-09 DIAGNOSIS — Z12.4 PAP SMEAR FOR CERVICAL CANCER SCREENING: Primary | ICD-10-CM

## 2025-01-09 ASSESSMENT — LIFESTYLE VARIABLES
HOW OFTEN DO YOU HAVE A DRINK CONTAINING ALCOHOL: NEVER
HOW OFTEN DO YOU HAVE SIX OR MORE DRINKS ON ONE OCCASION: NEVER
HOW MANY STANDARD DRINKS CONTAINING ALCOHOL DO YOU HAVE ON A TYPICAL DAY: PATIENT DOES NOT DRINK
SKIP TO QUESTIONS 9-10: 1
AUDIT-C TOTAL SCORE: 0

## 2025-01-09 ASSESSMENT — ENCOUNTER SYMPTOMS
OCCASIONAL FEELINGS OF UNSTEADINESS: 0
DEPRESSION: 0
LOSS OF SENSATION IN FEET: 0

## 2025-01-09 ASSESSMENT — PAIN SCALES - GENERAL: PAINLEVEL_OUTOF10: 0-NO PAIN

## 2025-01-22 LAB
CYTOLOGY CMNT CVX/VAG CYTO-IMP: NORMAL
LAB AP HPV GENOTYPE QUESTION: YES
LAB AP HPV HR: NORMAL
LABORATORY COMMENT REPORT: NORMAL
PATH REPORT.TOTAL CANCER: NORMAL

## 2025-02-14 ENCOUNTER — APPOINTMENT (OUTPATIENT)
Dept: PRIMARY CARE | Facility: CLINIC | Age: 40
End: 2025-02-14
Payer: COMMERCIAL

## 2025-02-18 PROBLEM — M25.50 ARTHRALGIA: Status: ACTIVE | Noted: 2025-02-18

## 2025-03-03 ENCOUNTER — APPOINTMENT (OUTPATIENT)
Dept: PRIMARY CARE | Facility: CLINIC | Age: 40
End: 2025-03-03
Payer: COMMERCIAL

## 2025-04-04 DIAGNOSIS — F41.9 ANXIETY: ICD-10-CM

## 2025-04-04 RX ORDER — SERTRALINE HYDROCHLORIDE 25 MG/1
25 TABLET, FILM COATED ORAL DAILY
Qty: 30 TABLET | Refills: 1 | Status: SHIPPED | OUTPATIENT
Start: 2025-04-04

## 2025-04-07 ENCOUNTER — APPOINTMENT (OUTPATIENT)
Dept: PRIMARY CARE | Facility: CLINIC | Age: 40
End: 2025-04-07
Payer: COMMERCIAL

## 2025-04-08 ENCOUNTER — APPOINTMENT (OUTPATIENT)
Dept: PRIMARY CARE | Facility: CLINIC | Age: 40
End: 2025-04-08
Payer: COMMERCIAL

## 2025-04-10 ENCOUNTER — APPOINTMENT (OUTPATIENT)
Dept: PRIMARY CARE | Facility: CLINIC | Age: 40
End: 2025-04-10
Payer: COMMERCIAL

## 2025-04-16 ENCOUNTER — APPOINTMENT (OUTPATIENT)
Dept: PRIMARY CARE | Facility: CLINIC | Age: 40
End: 2025-04-16
Payer: COMMERCIAL

## 2025-04-28 ENCOUNTER — APPOINTMENT (OUTPATIENT)
Dept: PRIMARY CARE | Facility: CLINIC | Age: 40
End: 2025-04-28
Payer: COMMERCIAL

## 2025-05-07 NOTE — PROGRESS NOTES
[facility]: [facility]  PROGRESS NOTE and annual physical      Abigail Mann is a 39 y.o. female that is presenting today to establish care with new provider and annual physical exam.  She is a former patient of DR. Mejia last seen on 2/9/202 for annual physical.  Patient requesting annual physical today.  She is doing well - would like a refill on zoloft today.    Assessment/Plan   Assessment & Plan  Encounter to establish care  Medications and problem list reconciled today  Orders:    Comprehensive Metabolic Panel; Future    CBC; Future    Mixed anxiety and depressive disorder  Doing well   Managed with zoloft  Will refill today       Anxiety    Orders:    sertraline (Zoloft) 25 mg tablet; Take 1 tablet (25 mg) by mouth once daily.    Hair loss    Orders:    Thyroid Stimulating Hormone; Future    Vitamin D 25-Hydroxy,Total (for eval of Vitamin D levels); Future    Vitamin B12; Future    Hyperlipidemia, unspecified hyperlipidemia type    Orders:    Lipid Panel; Future    Impaired fasting glucose    Orders:    Hemoglobin A1C; Future    Annual physical exam  Medications and problem list reconciled today  Current with gyn exam and pap  Mammograms per gyn d/t mom had breast cancer  Will order labs today         Subjective   HPI  Review of Systems   Constitutional: Negative.    HENT: Negative.     Eyes: Negative.    Respiratory: Negative.     Cardiovascular: Negative.    Gastrointestinal: Negative.    Endocrine: Negative.    Genitourinary: Negative.    Musculoskeletal: Negative.    Skin: Negative.    Allergic/Immunologic: Negative.    Neurological: Negative.    Hematological: Negative.    Psychiatric/Behavioral: Negative.        Objective   Vitals:    05/08/25 0952   BP: 118/80   Pulse: 76   Temp: 36.2 °C (97.1 °F)   SpO2: 98%      Body mass index is 20.37 kg/m².  Physical Exam  Vitals reviewed.   Constitutional:       Appearance: Normal appearance.   HENT:      Head: Normocephalic.       Right Ear: Tympanic membrane normal.      Left Ear: Tympanic membrane normal.      Nose: Nose normal.      Mouth/Throat:      Mouth: Mucous membranes are moist.      Pharynx: Oropharynx is clear.   Eyes:      Extraocular Movements: Extraocular movements intact.      Pupils: Pupils are equal, round, and reactive to light.   Cardiovascular:      Rate and Rhythm: Normal rate and regular rhythm.   Pulmonary:      Breath sounds: Normal breath sounds.   Abdominal:      General: Abdomen is flat. Bowel sounds are normal.      Palpations: Abdomen is soft.   Musculoskeletal:         General: Normal range of motion.      Cervical back: Normal range of motion.   Skin:     General: Skin is warm.      Capillary Refill: Capillary refill takes less than 2 seconds.   Neurological:      General: No focal deficit present.      Mental Status: She is alert.   Psychiatric:         Mood and Affect: Mood normal.       Vitals:    05/08/25 0952   BP: 118/80   Pulse: 76   Temp: 36.2 °C (97.1 °F)   SpO2: 98%       Diagnostic Results   Lab Results   Component Value Date    GLUCOSE 86 02/09/2024    CALCIUM 9.6 02/09/2024     02/09/2024    K 4.2 02/09/2024    CO2 27 02/09/2024     02/09/2024    BUN 13 02/09/2024    CREATININE 0.80 02/09/2024     Lab Results   Component Value Date    ALT 14 09/27/2022    AST 17 09/27/2022    ALKPHOS 66 09/27/2022    BILITOT 0.3 09/27/2022     Lab Results   Component Value Date    WBC 11.8 (H) 11/06/2024    HGB 9.5 (L) 11/06/2024    HCT 30.0 (L) 11/06/2024    MCV 97 11/06/2024     11/06/2024     Lab Results   Component Value Date    CHOL 208 (H) 02/09/2024    CHOL 199 09/27/2022    CHOL 196 02/20/2019     Lab Results   Component Value Date    HDL 78.0 02/09/2024    HDL 67 09/27/2022    HDL 79 02/20/2019     Lab Results   Component Value Date    LDLCALC 115 02/09/2024    LDLCALC 113 09/27/2022    LDLCALC 101 02/20/2019     Lab Results   Component Value Date    TRIG 75 02/09/2024    TRIG 96  "09/27/2022    TRIG 79 02/20/2019     No components found for: \"CHOLHDL\"  No results found for: \"HGBA1C\"  Other labs not included in the list above were reviewed either before or during this encounter.    History    Medical History[1]  Surgical History[2]  Family History[3]  Social History     Socioeconomic History    Marital status:      Spouse name: dori    Number of children: Not on file    Years of education: Not on file    Highest education level: Not on file   Occupational History    Not on file   Tobacco Use    Smoking status: Never     Passive exposure: Never    Smokeless tobacco: Never   Vaping Use    Vaping status: Never Used   Substance and Sexual Activity    Alcohol use: Not Currently    Drug use: Never    Sexual activity: Yes     Partners: Male     Birth control/protection: None   Other Topics Concern    Not on file   Social History Narrative    Not on file     Social Drivers of Health     Financial Resource Strain: High Risk (11/5/2024)    Overall Financial Resource Strain (CARDIA)     Difficulty of Paying Living Expenses: Very hard   Food Insecurity: No Food Insecurity (11/5/2024)    Hunger Vital Sign     Worried About Running Out of Food in the Last Year: Never true     Ran Out of Food in the Last Year: Never true   Transportation Needs: No Transportation Needs (11/5/2024)    PRAPARE - Transportation     Lack of Transportation (Medical): No     Lack of Transportation (Non-Medical): No   Physical Activity: Sufficiently Active (11/5/2024)    Exercise Vital Sign     Days of Exercise per Week: 7 days     Minutes of Exercise per Session: 60 min   Stress: No Stress Concern Present (11/5/2024)    [employer] [place] [employer] - Occupational Stress Questionnaire     Feeling of Stress : Not at all   Social Connections: Socially Integrated (11/5/2024)    Social Connection and Isolation Panel [NHANES]     Frequency of Communication with Friends and Family: Three times a week     Frequency of " Social Gatherings with Friends and Family: Three times a week     Attends Muslim Services: More than 4 times per year     Active Member of Clubs or Organizations: Yes     Attends Club or Organization Meetings: More than 4 times per year     Marital Status:    Intimate Partner Violence: Not At Risk (2024)    Humiliation, Afraid, Rape, and Kick questionnaire     Fear of Current or Ex-Partner: No     Emotionally Abused: No     Physically Abused: No     Sexually Abused: No     Allergies[4]  Medications Ordered Prior to Encounter[5]  Immunization History   Administered Date(s) Administered    Flu vaccine, quadrivalent, recombinant, preservative free, adult (FLUBLOK) 10/12/2022    Flu vaccine, trivalent, preservative free, age 6 months and greater (Fluarix/Fluzone/Flulaval) 10/10/2024    Influenza, injectable, quadrivalent 2018, 10/02/2019    Influenza, seasonal, injectable 2011    Moderna SARS-CoV-2 Vaccination 2021, 2021, 2022    RESPIRATORY SYNCYTIAL VIRUS (RSV), ELIGIBLE PREGNANT PTS, 0.5 ML (ABRYSVO) 2024    Tdap vaccine, age 7 year and older (BOOSTRIX, ADACEL) 10/31/2012, 2018, 2024     Patient's medical history was reviewed and updated either before or during this encounter.       Snow Bardales, APRN-CNP         [1]   Past Medical History:  Diagnosis Date    Arthralgia 2023    Costochondral junction syndrome 2023    Depression    [2]   Past Surgical History:  Procedure Laterality Date     SECTION, LOW TRANSVERSE  2020    x 2 & 2018    D&C FIRST TRIMESTER / TX INCOMPLETE / MISSED / SEPTIC / INDUCED       NASAL POLYP SURGERY Right 2020   [3]   Family History  Problem Relation Name Age of Onset    Breast cancer Mother Josselin     Colon cancer Father's Brother Ed     Breast cancer Paternal Grandmother Paternal grandmother    [4] No Known Allergies  [5]   Current Outpatient Medications on File Prior to Visit   Medication Sig Dispense  Refill    ALPRAZolam (Xanax) 0.25 mg tablet Take 1 tablet (0.25 mg) by mouth once daily as needed for anxiety. 20 tablet 0    cholecalciferol (Vitamin D-3) 25 MCG (1000 UT) tablet Take 1 tablet (25 mcg) by mouth once daily.      docusate sodium (Colace) 100 mg capsule Take 1 capsule (100 mg) by mouth once daily as needed for constipation.      multivitamin tablet Take 1 tablet by mouth once daily.      psyllium (Metamucil) 3.4 gram packet Take 1 packet by mouth once daily.      [DISCONTINUED] sertraline (Zoloft) 25 mg tablet Take 1 tablet (25 mg) by mouth once daily. 30 tablet 1    [DISCONTINUED] prenatal vitamin, iron-folic, (Prenatal) 27 mg iron-800 mcg folic acid tablet Take 1 tablet by mouth once daily. (Patient not taking: Reported on 5/8/2025)      [DISCONTINUED] psyllium (Metamucil) 3.4 gram packet Take 1 packet by mouth 2 times a day. Mix and drink with at least 8 ounces of water or juice. (Patient not taking: No sig reported)       No current facility-administered medications on file prior to visit.

## 2025-05-08 ENCOUNTER — APPOINTMENT (OUTPATIENT)
Dept: PRIMARY CARE | Facility: CLINIC | Age: 40
End: 2025-05-08
Payer: COMMERCIAL

## 2025-05-08 ENCOUNTER — OFFICE VISIT (OUTPATIENT)
Dept: PRIMARY CARE | Facility: CLINIC | Age: 40
End: 2025-05-08
Payer: COMMERCIAL

## 2025-05-08 VITALS
SYSTOLIC BLOOD PRESSURE: 118 MMHG | DIASTOLIC BLOOD PRESSURE: 80 MMHG | OXYGEN SATURATION: 98 % | WEIGHT: 134 LBS | HEART RATE: 76 BPM | HEIGHT: 68 IN | TEMPERATURE: 97.1 F | BODY MASS INDEX: 20.31 KG/M2

## 2025-05-08 DIAGNOSIS — R73.01 IMPAIRED FASTING GLUCOSE: ICD-10-CM

## 2025-05-08 DIAGNOSIS — Z00.00 ANNUAL PHYSICAL EXAM: ICD-10-CM

## 2025-05-08 DIAGNOSIS — E78.5 HYPERLIPIDEMIA, UNSPECIFIED HYPERLIPIDEMIA TYPE: ICD-10-CM

## 2025-05-08 DIAGNOSIS — F41.8 MIXED ANXIETY AND DEPRESSIVE DISORDER: ICD-10-CM

## 2025-05-08 DIAGNOSIS — L65.9 HAIR LOSS: ICD-10-CM

## 2025-05-08 DIAGNOSIS — Z76.89 ENCOUNTER TO ESTABLISH CARE: Primary | ICD-10-CM

## 2025-05-08 DIAGNOSIS — F41.9 ANXIETY: ICD-10-CM

## 2025-05-08 PROCEDURE — 1036F TOBACCO NON-USER: CPT | Performed by: NURSE PRACTITIONER

## 2025-05-08 PROCEDURE — 99395 PREV VISIT EST AGE 18-39: CPT | Performed by: NURSE PRACTITIONER

## 2025-05-08 PROCEDURE — 3008F BODY MASS INDEX DOCD: CPT | Performed by: NURSE PRACTITIONER

## 2025-05-08 RX ORDER — SERTRALINE HYDROCHLORIDE 25 MG/1
25 TABLET, FILM COATED ORAL DAILY
Qty: 90 TABLET | Refills: 3 | Status: SHIPPED | OUTPATIENT
Start: 2025-05-08

## 2025-05-08 RX ORDER — BISMUTH SUBSALICYLATE 262 MG
1 TABLET,CHEWABLE ORAL DAILY
COMMUNITY

## 2025-05-08 ASSESSMENT — ENCOUNTER SYMPTOMS
ENDOCRINE NEGATIVE: 1
ALLERGIC/IMMUNOLOGIC NEGATIVE: 1
EYES NEGATIVE: 1
GASTROINTESTINAL NEGATIVE: 1
MUSCULOSKELETAL NEGATIVE: 1
NEUROLOGICAL NEGATIVE: 1
RESPIRATORY NEGATIVE: 1
HEMATOLOGIC/LYMPHATIC NEGATIVE: 1
PSYCHIATRIC NEGATIVE: 1
CONSTITUTIONAL NEGATIVE: 1
CARDIOVASCULAR NEGATIVE: 1

## 2025-05-08 ASSESSMENT — PATIENT HEALTH QUESTIONNAIRE - PHQ9
1. LITTLE INTEREST OR PLEASURE IN DOING THINGS: NOT AT ALL
SUM OF ALL RESPONSES TO PHQ9 QUESTIONS 1 AND 2: 0
2. FEELING DOWN, DEPRESSED OR HOPELESS: NOT AT ALL

## 2025-05-08 ASSESSMENT — PAIN SCALES - GENERAL: PAINLEVEL_OUTOF10: 0-NO PAIN

## 2025-05-09 LAB
25(OH)D3+25(OH)D2 SERPL-MCNC: 61 NG/ML (ref 30–100)
ALBUMIN SERPL-MCNC: 4.4 G/DL (ref 3.6–5.1)
ALP SERPL-CCNC: 54 U/L (ref 31–125)
ALT SERPL-CCNC: 14 U/L (ref 6–29)
ANION GAP SERPL CALCULATED.4IONS-SCNC: 9 MMOL/L (CALC) (ref 7–17)
AST SERPL-CCNC: 17 U/L (ref 10–30)
BILIRUB SERPL-MCNC: 0.5 MG/DL (ref 0.2–1.2)
BUN SERPL-MCNC: 13 MG/DL (ref 7–25)
CALCIUM SERPL-MCNC: 9.4 MG/DL (ref 8.6–10.2)
CHLORIDE SERPL-SCNC: 104 MMOL/L (ref 98–110)
CHOLEST SERPL-MCNC: 201 MG/DL
CHOLEST/HDLC SERPL: 2.8 (CALC)
CO2 SERPL-SCNC: 28 MMOL/L (ref 20–32)
CREAT SERPL-MCNC: 0.67 MG/DL (ref 0.5–0.97)
EGFRCR SERPLBLD CKD-EPI 2021: 115 ML/MIN/1.73M2
ERYTHROCYTE [DISTWIDTH] IN BLOOD BY AUTOMATED COUNT: 12 % (ref 11–15)
EST. AVERAGE GLUCOSE BLD GHB EST-MCNC: 111 MG/DL
EST. AVERAGE GLUCOSE BLD GHB EST-SCNC: 6.2 MMOL/L
GLUCOSE SERPL-MCNC: 87 MG/DL (ref 65–99)
HBA1C MFR BLD: 5.5 %
HCT VFR BLD AUTO: 37.2 % (ref 35–45)
HDLC SERPL-MCNC: 71 MG/DL
HGB BLD-MCNC: 12.2 G/DL (ref 11.7–15.5)
LDLC SERPL CALC-MCNC: 116 MG/DL (CALC)
MCH RBC QN AUTO: 30.7 PG (ref 27–33)
MCHC RBC AUTO-ENTMCNC: 32.8 G/DL (ref 32–36)
MCV RBC AUTO: 93.5 FL (ref 80–100)
NONHDLC SERPL-MCNC: 130 MG/DL (CALC)
PLATELET # BLD AUTO: 260 THOUSAND/UL (ref 140–400)
PMV BLD REES-ECKER: 11.1 FL (ref 7.5–12.5)
POTASSIUM SERPL-SCNC: 4.3 MMOL/L (ref 3.5–5.3)
PROT SERPL-MCNC: 6.9 G/DL (ref 6.1–8.1)
RBC # BLD AUTO: 3.98 MILLION/UL (ref 3.8–5.1)
SODIUM SERPL-SCNC: 141 MMOL/L (ref 135–146)
TRIGL SERPL-MCNC: 52 MG/DL
TSH SERPL-ACNC: 1.47 MIU/L
VIT B12 SERPL-MCNC: 783 PG/ML (ref 200–1100)
WBC # BLD AUTO: 4.5 THOUSAND/UL (ref 3.8–10.8)

## 2025-05-09 NOTE — RESULT ENCOUNTER NOTE
Let the patient know via mychart- her lipid panel is within acceptable range all other labs normal per Snow Bardales, CNP

## 2025-08-13 ENCOUNTER — TELEPHONE (OUTPATIENT)
Dept: OBSTETRICS AND GYNECOLOGY | Facility: CLINIC | Age: 40
End: 2025-08-13
Payer: COMMERCIAL

## 2025-08-13 DIAGNOSIS — Z12.31 VISIT FOR SCREENING MAMMOGRAM: ICD-10-CM

## 2025-08-27 ENCOUNTER — HOSPITAL ENCOUNTER (OUTPATIENT)
Dept: RADIOLOGY | Facility: CLINIC | Age: 40
Discharge: HOME | End: 2025-08-27
Payer: COMMERCIAL

## 2025-08-27 VITALS — WEIGHT: 134 LBS | BODY MASS INDEX: 20.37 KG/M2

## 2025-08-27 DIAGNOSIS — Z12.31 VISIT FOR SCREENING MAMMOGRAM: ICD-10-CM

## 2025-08-27 PROCEDURE — 77067 SCR MAMMO BI INCL CAD: CPT

## 2025-08-27 PROCEDURE — 77067 SCR MAMMO BI INCL CAD: CPT | Performed by: RADIOLOGY

## 2025-08-27 PROCEDURE — 77063 BREAST TOMOSYNTHESIS BI: CPT | Performed by: RADIOLOGY

## 2025-08-29 ENCOUNTER — TELEPHONE (OUTPATIENT)
Dept: OBSTETRICS AND GYNECOLOGY | Facility: CLINIC | Age: 40
End: 2025-08-29
Payer: COMMERCIAL

## 2025-08-29 DIAGNOSIS — Z91.89 INCREASED RISK OF BREAST CANCER: ICD-10-CM

## 2025-08-29 DIAGNOSIS — R92.30 INCONCLUSIVE MAMMOGRAM DUE TO DENSE BREASTS: ICD-10-CM

## 2025-08-29 DIAGNOSIS — R92.2 INCONCLUSIVE MAMMOGRAM DUE TO DENSE BREASTS: ICD-10-CM

## (undated) DEVICE — SLEEVE, VASO PRESS, CALF GARMENT, MEDIUM, GREEN

## (undated) DEVICE — Device

## (undated) DEVICE — PREP TRAY, VAGINAL

## (undated) DEVICE — SUTURE, VICRYL 0, 36 IN, CT-1, VIOLET

## (undated) DEVICE — SUTURE, MONOCRYL, 4-0, 27 IN, PS-2, UNDYED

## (undated) DEVICE — CAUTERY, PENCIL, PUSH BUTTON, SMOKE EVAC, 70MM

## (undated) DEVICE — GLOVE, SURGICAL, PROTEXIS PI , 6.5, PF, LF

## (undated) DEVICE — PAD, GROUNDING, ELECTROSURGICAL, W/9 FT CABLE, POLYHESIVE II, ADULT, LF